# Patient Record
Sex: FEMALE | Race: WHITE
[De-identification: names, ages, dates, MRNs, and addresses within clinical notes are randomized per-mention and may not be internally consistent; named-entity substitution may affect disease eponyms.]

---

## 2021-11-09 LAB
BUN BLD-MCNC: 17 MG/DL (ref 7–18)
GLUCOSE SERPLBLD-MCNC: 94 MG/DL (ref 74–106)
HCT VFR BLD CALC: 38.5 % (ref 36–45)
LYMPHOCYTES # SPEC AUTO: 1.9 K/UL (ref 0.7–4.9)
PMV BLD: 5.9 FL (ref 7.6–11.3)
POTASSIUM SERPL-SCNC: 4.4 MMOL/L (ref 3.5–5.1)
RBC # BLD: 4.35 M/UL (ref 3.86–4.86)

## 2021-11-09 NOTE — RAD REPORT
EXAM DESCRIPTION:  RAD - Chest Pa And Lat (2 Views) - 11/9/2021 12:44 pm

 

CLINICAL HISTORY:  PRE-OP, right breast mass

 

COMPARISON:  None

 

TECHNIQUE:  Frontal and lateral views of the chest were obtained.

 

FINDINGS:  The lungs are clear.   Heart size is normal and central vasculature is within normal limit
s.  No pleural effusion or pneumothorax seen.  No acute bony finding noted.  No aortic abnormality.

 

IMPRESSION:  No acute cardiopulmonary process.

## 2021-11-10 ENCOUNTER — HOSPITAL ENCOUNTER (OUTPATIENT)
Dept: HOSPITAL 97 - OR | Age: 61
Discharge: HOME | End: 2021-11-10
Attending: SURGERY
Payer: COMMERCIAL

## 2021-11-10 VITALS — SYSTOLIC BLOOD PRESSURE: 110 MMHG | DIASTOLIC BLOOD PRESSURE: 65 MMHG

## 2021-11-10 VITALS — TEMPERATURE: 97.1 F

## 2021-11-10 VITALS — OXYGEN SATURATION: 94 %

## 2021-11-10 DIAGNOSIS — D05.11: Primary | ICD-10-CM

## 2021-11-10 PROCEDURE — 0HBT0ZZ EXCISION OF RIGHT BREAST, OPEN APPROACH: ICD-10-PCS

## 2021-11-10 PROCEDURE — 93005 ELECTROCARDIOGRAM TRACING: CPT

## 2021-11-10 PROCEDURE — 07B50ZX EXCISION OF RIGHT AXILLARY LYMPHATIC, OPEN APPROACH, DIAGNOSTIC: ICD-10-PCS

## 2021-11-10 PROCEDURE — 85025 COMPLETE CBC W/AUTO DIFF WBC: CPT

## 2021-11-10 PROCEDURE — 38525 BIOPSY/REMOVAL LYMPH NODES: CPT

## 2021-11-10 PROCEDURE — 88333 PATH CONSLTJ SURG CYTO XM 1: CPT

## 2021-11-10 PROCEDURE — 88334 PATH CONSLTJ SURG CYTO XM EA: CPT

## 2021-11-10 PROCEDURE — 80048 BASIC METABOLIC PNL TOTAL CA: CPT

## 2021-11-10 PROCEDURE — 19301 PARTIAL MASTECTOMY: CPT

## 2021-11-10 PROCEDURE — 88305 TISSUE EXAM BY PATHOLOGIST: CPT

## 2021-11-10 PROCEDURE — 38900 IO MAP OF SENT LYMPH NODE: CPT

## 2021-11-10 PROCEDURE — 78195 LYMPH SYSTEM IMAGING: CPT

## 2021-11-10 PROCEDURE — 36415 COLL VENOUS BLD VENIPUNCTURE: CPT

## 2021-11-10 PROCEDURE — 19285 PERQ DEV BREAST 1ST US IMAG: CPT

## 2021-11-10 PROCEDURE — 88307 TISSUE EXAM BY PATHOLOGIST: CPT

## 2021-11-10 PROCEDURE — 71046 X-RAY EXAM CHEST 2 VIEWS: CPT

## 2021-11-10 RX ADMIN — HYDROMORPHONE HYDROCHLORIDE ONE MG: 1 INJECTION, SOLUTION INTRAMUSCULAR; INTRAVENOUS; SUBCUTANEOUS at 11:56

## 2021-11-10 RX ADMIN — HYDROMORPHONE HYDROCHLORIDE ONE MG: 1 INJECTION, SOLUTION INTRAMUSCULAR; INTRAVENOUS; SUBCUTANEOUS at 12:04

## 2021-11-10 NOTE — RAD REPORT
EXAM DESCRIPTION:  US - Brst,Preop NL Wire Init w/Guid - 11/10/2021 8:34 am

 

CLINICAL HISTORY:  Breast cancer

 

FINDINGS:  The skin, subcutaneous tissues and breast tissue were anesthetized with lidocaine.

 

Under sonographic guidance a Kopan's hook wire was placed into the right breast mass mass within the 
upper inner right breast.

 

The patient then left for the surgical department

 

IMPRESSION:  Ultrasound-guided needle wire localization of a right breast mass

## 2021-11-10 NOTE — RAD REPORT
EXAM DESCRIPTION:  NM - Lymphoscintigraphy - 11/10/2021 8:07 am

 

CLINICAL HISTORY:   Breast cancer

 

COMPARISON:  None.

 

TECHNIQUE:  Four injections of 0.01 millicuries technetium filtered sulfur colloid administered into 
the the sanam arerolar region of the right breast. The injections were placed at Twelve o'clock, 3 o'c
lock, 6 o'clock and 9 o'clock positions.

 

Subsequently a scintigram was obtained which demonstrated the radiotracer within these locations.

 

IMPRESSION:  Right breast lymphoscintigram.

## 2021-11-10 NOTE — OP
Date of Procedure:  11/10/2021



Surgeon:  Teddy Marie MD



Assistant:  HARSHAL Causey



Preoperative Diagnosis:  Right breast cancer.



Postoperative Diagnosis:  Right breast cancer.



Procedures:  Needle localization, right breast lumpectomy and sentinel node biopsy.



Estimated Blood Loss:  Minimal.



Specimen:  [place] node which was negative for metastatic disease and right breast lumpectomy with m
argins free.



Findings:  As above.



Anesthesia:  General.



Complications:  None.



Disposition:  The patient tolerated the procedure in stable condition and taken to Recovery in good g
eneral condition.



Procedure In Detail:  The patient was brought to the OR and placed in supine position.  General anest
hesia begun.  Prior to being the patient prepped and draped, methylene blue under sterile condition w
as injected around the nipple-areolar complex and then breast was massaged and then the patient was p
repped and draped in the usual sterile fashion.  Then, the sentinel node counting device was used to 
identify the sentinel node in the right axilla.  All the counts were recorded in the medical record. 
 A 3 cm incision was made in the right axilla and a blue lymph node was identified in the deep tissue
.  Vascular clips were used to clamp the neurovascular attachments to the lymph node and then lymph n
ode was excised.  Frozen section revealed no evidence of metastatic disease.  Wound irrigated.  Bleed
ing controlled with cautery and then 3-0 chromic used to reapproximate subcutaneous tissue and close 
the skin and then around the needle that was placed during needle localization, ellipse of skin media
l to lateral was made approximately 8 x 3 cm.  Subcutaneous tissue divided.  Flaps created in all dir
ections and dissection proceeded all the way down to the pectoralis fascia and the entire tissue at t
he tip of the needle was excised and sent to pathology for margin check, margins were negative.  The 
closest margin was deep one, which was approximately 5-6 mm.  Wound irrigated.  Bleeding controlled w
ith cautery.  A 3-0 chromic used to approximate the subcutaneous tissue and close the skin.  Sterile 
dressing applied.  The patient was awakened and taken to Recovery in good general condition.



Discharge Note:  The patient will go to Day Surgery and home when stable.



Disposition:  Home.



Condition:  Stable.



Discharge Instructions:  Resume home medications and diet.  Activity as tolerated.  No heavy lifting.
  Keep dressing clean and dry.  Sponge bath only.  Tylenol No.3 one tablet p.o. q.4 p.r.n. pain, Kefl
ex 500 mg p.o. q.6.  Follow up in my office in 1 week.  Call for appointment.





AS/APRIL

DD:  11/10/2021 11:12:44Voice ID:  [id number]

DT:  11/10/2021 16:01:43Report ID:  [id number]

## 2021-11-10 NOTE — EKG
Test Date:    2021-11-09               Test Time:    12:05:32

Technician:   FRIDA                                   

                                                     

MEASUREMENT RESULTS:                                       

Intervals:                                           

Rate:         63                                     

OH:           132                                    

QRSD:         82                                     

QT:           460                                    

QTc:          470                                    

Axis:                                                

P:            32                                     

OH:           132                                    

QRS:          69                                     

T:            30                                     

                                                     

INTERPRETIVE STATEMENTS:                                       

                                                     

Normal sinus rhythm

Nonspecific ST and T wave abnormality

Prolonged QT

Abnormal ECG

No previous ECG available for comparison



Electronically Signed On 11-10-21 11:20:21 CST by Vipin Ronquillo

## 2021-12-06 LAB
HCT VFR BLD CALC: 37.6 % (ref 36–45)
LYMPHOCYTES # SPEC AUTO: 1.6 K/UL (ref 0.7–4.9)
PMV BLD: 6.1 FL (ref 7.6–11.3)
RBC # BLD: 4.23 M/UL (ref 3.86–4.86)

## 2021-12-08 ENCOUNTER — HOSPITAL ENCOUNTER (OUTPATIENT)
Dept: HOSPITAL 97 - OR | Age: 61
Discharge: HOME | End: 2021-12-08
Attending: SURGERY
Payer: COMMERCIAL

## 2021-12-08 VITALS — TEMPERATURE: 97.6 F | OXYGEN SATURATION: 96 % | DIASTOLIC BLOOD PRESSURE: 70 MMHG | SYSTOLIC BLOOD PRESSURE: 113 MMHG

## 2021-12-08 DIAGNOSIS — C50.911: Primary | ICD-10-CM

## 2021-12-08 PROCEDURE — 76000 FLUOROSCOPY <1 HR PHYS/QHP: CPT

## 2021-12-08 PROCEDURE — 36561 INSERT TUNNELED CV CATH: CPT

## 2021-12-08 PROCEDURE — 85025 COMPLETE CBC W/AUTO DIFF WBC: CPT

## 2021-12-08 PROCEDURE — 71045 X-RAY EXAM CHEST 1 VIEW: CPT

## 2021-12-08 PROCEDURE — 0JH60WZ INSERTION OF TOTALLY IMPLANTABLE VASCULAR ACCESS DEVICE INTO CHEST SUBCUTANEOUS TISSUE AND FASCIA, OPEN APPROACH: ICD-10-PCS

## 2021-12-08 PROCEDURE — 36415 COLL VENOUS BLD VENIPUNCTURE: CPT

## 2021-12-08 RX ADMIN — HEPARIN SODIUM ONE UNIT: 5000 INJECTION, SOLUTION INTRAVENOUS; SUBCUTANEOUS at 10:07

## 2021-12-08 RX ADMIN — HEPARIN SODIUM ONE UNIT: 5000 INJECTION, SOLUTION INTRAVENOUS; SUBCUTANEOUS at 09:59

## 2021-12-08 RX ADMIN — HEPARIN SODIUM ONE UNIT: 1000 INJECTION, SOLUTION INTRAVENOUS; SUBCUTANEOUS at 09:58

## 2021-12-08 RX ADMIN — HEPARIN SODIUM ONE UNIT: 1000 INJECTION, SOLUTION INTRAVENOUS; SUBCUTANEOUS at 10:07

## 2021-12-08 RX ADMIN — CEFAZOLIN ONE MLS: 1 INJECTION, POWDER, FOR SOLUTION INTRAMUSCULAR; INTRAVENOUS; PARENTERAL at 09:45

## 2021-12-08 RX ADMIN — HEPARIN SODIUM ONE UNIT: 5000 INJECTION, SOLUTION INTRAVENOUS; SUBCUTANEOUS at 10:08

## 2021-12-08 RX ADMIN — CEFAZOLIN ONE MLS: 1 INJECTION, POWDER, FOR SOLUTION INTRAMUSCULAR; INTRAVENOUS; PARENTERAL at 09:21

## 2021-12-08 NOTE — RAD REPORT
EXAM DESCRIPTION:  RADRosendot Single View12/8/2021 10:51 am

 

CLINICAL HISTORY:  Device placement/central venous catheter placement

 

 

IMPRESSION:  Central venous catheter with its tip in the superior vena cava

 

No pneumothorax

## 2021-12-08 NOTE — OP
Date of Procedure:  12/08/2021



Surgeon:  Teddy Marie MD



Assistant:  HARSHAL Cordon.



Preoperative Diagnosis:  Right breast cancer.



Postoperative Diagnosis:  Right breast cancer.



Procedure:  Left internal jugular Port-A-Cath placement and interpretation, intraoperative fluoroscop
y.



Estimated Blood Loss:  Minimal.



Specimens:  None.



Findings:  Normal anatomy.



Anesthesia:  General.



Complications:  None.



Condition:  Patient tolerated the procedure in stable condition.  Taken to Recovery in good general c
ondition.



Operative Report:  The patient was brought to the OR and placed in supine position.  General anesthes
ia began.  The patient was prepped and draped in usual sterile fashion.  Marcaine 0.5% was infiltrate
d locally for postop pain control.  18-gauge needle was used to access the left IJ vein, guidewire pa
ssed, position confirmed with fluoroscopy.  A 3 cm counter incision was made on the left anterior patti
st, subcutaneous tissue divided, pocket created.  Tunneling device used to tunnel the catheter betwee
n the two wounds.  Seldinger technique used.  Tip of the catheter placed in the SVC under fluoroscopy
.  After appropriate size, attached to the Port-A-Cath device.  Port-A-Cath device attached to the sanchez
bcutaneous tissue with 3-0 Vicryl and 2-0 chromic used to approximate the subcutaneous tissue and emmanuel
se the skin.  The port flushed with heparin and packed with heparin with good blood flow.  Sterile dr
essing applied.  Patient awakened and taken to Recovery in good general condition.  Chest x-ray has b
een ordered.  The patient will be discharged to home.



Disposition:  Home.



Condition:  Stable.



Discharge Instructions:  Resume home medications and diet.  Activity as tolerated.  No heavy lifting.
  Remove outer dressing in 2 days.  Shower.  Keep wound clean and dry.  Follow up in my office in 2 w
eeks.  Call for appointment.  Follow up in Cancer Center.  Keep Steri-Strips on at all times.  Tyleno
l No 3 one tablet p.o. q.4 p.r.n. pain.





AS/MODL

DD:  12/08/2021 10:25:48Voice ID:  [id number]

DT:  12/08/2021 10:41:41Report ID:  [id number]

## 2021-12-08 NOTE — RAD REPORT
EXAM DESCRIPTION:

RAD - Fluoroscopy <1 Hour - 12/8/2021 10:35 am

 

CLINICAL HISTORY:  Device placement central venous catheter placement

 

FINDINGS:  A central venous catheter was placed into the superior vena cava. Three fluoroscopic spot 
images are submitted. The examination was performed by Dr. Marie. Fluoroscopy time minutes

## 2022-01-26 ENCOUNTER — HOSPITAL ENCOUNTER (OUTPATIENT)
Dept: HOSPITAL 97 - ER | Age: 62
Setting detail: OBSERVATION
LOS: 1 days | Discharge: HOME | End: 2022-01-27
Attending: HOSPITALIST | Admitting: HOSPITALIST
Payer: COMMERCIAL

## 2022-01-26 VITALS — BODY MASS INDEX: 31.6 KG/M2

## 2022-01-26 DIAGNOSIS — A41.9: Primary | ICD-10-CM

## 2022-01-26 DIAGNOSIS — J12.9: ICD-10-CM

## 2022-01-26 DIAGNOSIS — E78.5: ICD-10-CM

## 2022-01-26 DIAGNOSIS — F41.9: ICD-10-CM

## 2022-01-26 DIAGNOSIS — Z20.822: ICD-10-CM

## 2022-01-26 DIAGNOSIS — Z80.9: ICD-10-CM

## 2022-01-26 DIAGNOSIS — C50.919: ICD-10-CM

## 2022-01-26 DIAGNOSIS — I10: ICD-10-CM

## 2022-01-26 LAB
ALBUMIN SERPL BCP-MCNC: 3.1 G/DL (ref 3.4–5)
ALP SERPL-CCNC: 162 U/L (ref 45–117)
ALT SERPL W P-5'-P-CCNC: 32 U/L (ref 12–78)
AMYLASE SERPL-CCNC: 39 U/L (ref 25–115)
ANISOCYTOSIS BLD QL: (no result)
AST SERPL W P-5'-P-CCNC: 15 U/L (ref 15–37)
BUN BLD-MCNC: 14 MG/DL (ref 7–18)
CKMB CREATINE KINASE MB: < 1 NG/ML (ref 1–3.6)
COHGB MFR BLDA: 1.2 % (ref 0–1.5)
GLUCOSE SERPLBLD-MCNC: 117 MG/DL (ref 74–106)
HCT VFR BLD CALC: 32.7 % (ref 36–45)
INR BLD: 0.96
LIPASE SERPL-CCNC: 39 U/L (ref 73–393)
LYMPHOCYTES # SPEC AUTO: 0.2 K/UL (ref 0.7–4.9)
MORPHOLOGY BLD-IMP: (no result)
OXYHGB MFR BLDA: 93.1 % (ref 94–97)
PMV BLD: 5.9 FL (ref 7.6–11.3)
POLYCHROMASIA BLD QL SMEAR: (no result)
POTASSIUM SERPL-SCNC: 3.9 MMOL/L (ref 3.5–5.1)
RBC # BLD: 3.54 M/UL (ref 3.86–4.86)
SAO2 % BLDA: 95.5 % (ref 92–98.5)
TOXIC GRANULES BLD QL SMEAR: (no result)
TROPONIN I SERPL HS-MCNC: 13.9 PG/ML (ref ?–58.9)

## 2022-01-26 PROCEDURE — 83605 ASSAY OF LACTIC ACID: CPT

## 2022-01-26 PROCEDURE — 71045 X-RAY EXAM CHEST 1 VIEW: CPT

## 2022-01-26 PROCEDURE — 96375 TX/PRO/DX INJ NEW DRUG ADDON: CPT

## 2022-01-26 PROCEDURE — 87040 BLOOD CULTURE FOR BACTERIA: CPT

## 2022-01-26 PROCEDURE — 85025 COMPLETE CBC W/AUTO DIFF WBC: CPT

## 2022-01-26 PROCEDURE — 84145 PROCALCITONIN (PCT): CPT

## 2022-01-26 PROCEDURE — 81015 MICROSCOPIC EXAM OF URINE: CPT

## 2022-01-26 PROCEDURE — 80076 HEPATIC FUNCTION PANEL: CPT

## 2022-01-26 PROCEDURE — 85610 PROTHROMBIN TIME: CPT

## 2022-01-26 PROCEDURE — 81003 URINALYSIS AUTO W/O SCOPE: CPT

## 2022-01-26 PROCEDURE — 83690 ASSAY OF LIPASE: CPT

## 2022-01-26 PROCEDURE — 82553 CREATINE MB FRACTION: CPT

## 2022-01-26 PROCEDURE — 82550 ASSAY OF CK (CPK): CPT

## 2022-01-26 PROCEDURE — 96374 THER/PROPH/DIAG INJ IV PUSH: CPT

## 2022-01-26 PROCEDURE — 85730 THROMBOPLASTIN TIME PARTIAL: CPT

## 2022-01-26 PROCEDURE — 82805 BLOOD GASES W/O2 SATURATION: CPT

## 2022-01-26 PROCEDURE — 93005 ELECTROCARDIOGRAM TRACING: CPT

## 2022-01-26 PROCEDURE — 36415 COLL VENOUS BLD VENIPUNCTURE: CPT

## 2022-01-26 PROCEDURE — 80048 BASIC METABOLIC PNL TOTAL CA: CPT

## 2022-01-26 PROCEDURE — 82150 ASSAY OF AMYLASE: CPT

## 2022-01-26 PROCEDURE — 84484 ASSAY OF TROPONIN QUANT: CPT

## 2022-01-26 PROCEDURE — 94760 N-INVAS EAR/PLS OXIMETRY 1: CPT

## 2022-01-26 PROCEDURE — 80053 COMPREHEN METABOLIC PANEL: CPT

## 2022-01-26 PROCEDURE — 99284 EMERGENCY DEPT VISIT MOD MDM: CPT

## 2022-01-26 RX ADMIN — ENOXAPARIN SODIUM SCH MG: 40 INJECTION SUBCUTANEOUS at 20:00

## 2022-01-26 RX ADMIN — Medication SCH ML: at 21:00

## 2022-01-26 RX ADMIN — SODIUM CHLORIDE SCH MLS: 9 INJECTION, SOLUTION INTRAVENOUS at 20:00

## 2022-01-26 NOTE — ER
Nurse's Notes                                                                                     

 [facility]                                                                 

Name: Megan Coronado                                                                                   

Age: 61 yrs                                                                                       

Sex: Female                                                                                       

: 1960                                                                                   

MRN: [id number]                                                                                   

Arrival Date: 2022                                                                          

Time: 10:42                                                                                       

Account#: [id number]                                                                            

Bed 27                                                                                            

Private MD: Earl Monteiro                                                                         

Diagnosis: Other pneumonia, unspecified organism;Elevated white blood cell count, unspecified     

                                                                                                  

Presentation:                                                                                     

                                                                                             

10:56 Chief complaint: Patient states: "I started running a fever this morning. fever high of jd3 

      100.3. yall will need to call Dr. Lowery over at the cancer center here in town.".      

      Coronavirus screen: cough unrelated to allergies, headache, Client presents with at         

      least one sign or symptom that may indicate coronavirus-19. Standard/surgical mask          

      placed on the client. Provider contacted for isolation considerations. Ebola Screen: No     

      symptoms or risks identified at this time. Initial Sepsis Screen: Does the patient meet     

      any 2 criteria? No. Patient's initial sepsis screen is negative. Does the patient have      

      a suspected source of infection? No. Patient's initial sepsis screen is negative. Risk      

      Assessment: Do you want to hurt yourself or someone else? Patient reports no desire to      

      harm self or others. Onset of symptoms was 2022.                                

10:56 Method Of Arrival: Ambulatory                                                           jd3 

10:56 Acuity: CECIL 3                                                                           jd3 

                                                                                                  

Historical:                                                                                       

- Allergies:                                                                                      

10:58 No Known Allergies;                                                                     jd3 

- Home Meds:                                                                                      

10:58 steroid [Active]; chemo [Active];                                                       jd3 

- PMHx:                                                                                           

10:58 breast cancer; high cholesteral;                                                        jd3 

- PSHx:                                                                                           

10:58 None;                                                                                   jd3 

                                                                                                  

- Immunization history:: Adult Immunizations up to date, Client reports having NOT                

  received the Covid vaccine. Flu vaccine is not up to date.                                      

- Social history:: Smoking status: Patient denies any tobacco usage or history of.                

- Family history:: not pertinent.                                                                 

                                                                                                  

                                                                                                  

Screenin:01 Abuse screen: Denies threats or abuse. Denies injuries from another. Nutritional        ab2 

      screening: No deficits noted. Tuberculosis screening: No symptoms or risk factors           

      identified. Fall Risk None identified.                                                      

                                                                                                  

Assessment:                                                                                       

11:59 General: Appears in no apparent distress. comfortable, Behavior is calm, cooperative,   ab2 

      appropriate for age. Pain: Denies pain. Neuro: Level of Consciousness is awake, alert,      

      obeys commands, Oriented to person, place, time, situation, Appropriate for age        

      are equal bilaterally Moves all extremities. Gait is steady. Cardiovascular: No             

      deficits noted. Denies chest pain, shortness of breath, Heart tones S1 S2 present           

      Patient's skin is warm and dry. Chest pain is denied. Respiratory: Airway is patent.        

      GI: No deficits noted. No signs and/or symptoms were reported involving the                 

      gastrointestinal system. : No deficits noted. No signs and/or symptoms were reported      

      regarding the genitourinary system. EENT: No deficits noted. No signs and/or symptoms       

      were reported regarding the EENT system. Derm: Parent/caregiver reports the patient         

      having Pt reports fever of 100.5. Musculoskeletal: No deficits noted. No signs and/or       

      symptoms reported regarding the musculoskeletal system.                                     

                                                                                                  

Vital Signs:                                                                                      

10:59  / 66; Pulse 100; Resp 18 S; Temp 99.4(TE); Pulse Ox 97% on R/A; Weight 86.64 kg  jd3 

      (R); Height 5 ft. 5 in. (165.10 cm) (R); Pain 0/10;                                         

12:01  / 59; Pulse 78; Resp 16; Pulse Ox 96% on R/A; Pain 0/10;                         ab2 

13:12  / 62; Pulse 75; Resp 16; Temp 98.8(O); Pulse Ox 96% ; Pain 0/10;                 ab2 

14:13  / 55; Pulse 81; Resp 16; Pulse Ox 100% on R/A;                                   ab2 

15:35 BP 97 / 55; Pulse 84; Resp 16; Pulse Ox 95% on R/A;                                     ab2 

16:34  / 71; Pulse 90; Resp 16; Pulse Ox 98% on R/A;                                    ab2 

10:59 Body Mass Index 31.78 (86.64 kg, 165.10 cm)                                             jd3 

                                                                                                  

ED Course:                                                                                        

10:42 Patient arrived in ED.                                                                  am2 

10:43 Earl Monteiro DO is Private Physician.                                                 am2 

10:58 Triage completed.                                                                       jd3 

11:00 Arm band placed on.                                                                     jd3 

11:03 Wilfrido Tidwell is Primary Nurse.                                                    ab2 

11:28 Hugo Browning MD is Attending Physician.                                           ma2 

11:33 Ky Leyva PA is PHCP.                                                                cp  

11:53 Amylase, Serum Sent.                                                                    ab2 

11:53 Basic Metabolic Panel Sent.                                                             ab2 

11:53 Blood Culture Adult (2) Sent.                                                           ab2 

11:53 CBC with Diff Sent.                                                                     ab2 

11:53 CPK Sent.                                                                               ab2 

11:53 Ckmb Sent.                                                                              ab2 

11:53 LFT's Sent.                                                                             ab2 

11:53 Lactate Sent.                                                                           ab2 

11:53 Lipase Sent.                                                                            ab2 

11:53 Procalcitonin Sent.                                                                     ab2 

11:53 Protime (+inr) Sent.                                                                    ab2 

11:53 Ptt, Activated Sent.                                                                    ab2 

11:53 Troponin HS Sent.                                                                       ab2 

11:56 SARS-COV-2 RT PCR (Document "Date of Onset" if Symptomatic) Sent.                       ab2 

12:01 Patient has correct armband on for positive identification. Bed in low position. Call   ab2 

      light in reach. Side rails up X2. Adult w/ patient.                                         

12:01 No provider procedures requiring assistance completed. Inserted saline lock: 20 gauge   ab2 

      in left antecubital area, using aseptic technique.                                          

12:44 Chest Single View XRAY In Process Unspecified.                                          EDMS

13:12 Urine Microscopic Only Sent.                                                            ab2 

14:41 Efra Mejia is Hospitalizing Provider.                                               ma2 

                                                                                                  

Administered Medications:                                                                         

11:59 Drug: NS 0.9% 1000 ml Route: IV; Rate: 1 bolus; Site: left antecubital;                 ab2 

13:51 Drug: Rocephin (cefTRIAXone) 1 grams Route: IV; Rate: calculated rate; Site: left       ab2 

      antecubital;                                                                                

13:51 Drug: AZITHromycin 500 mg Route: IVPB; Infused Over: 1 hrs; Site: left antecubital;     ab2 

                                                                                                  

                                                                                                  

Outcome:                                                                                          

14:41 Decision to Hospitalize by Provider.                                                    ma2 

                                                                                             

16:09 Patient left the ED.                                                                    5 

                                                                                                  

Signatures:                                                                                       

Dispatcher [employer]                           EDMS                                                 

Ky Leyva PA PA cp Martinez, Maria                              5                                                  

Etta Espinoza am2                                                  

Param Stuart RN                    RN   Hugo Coleman MD MD   ma2                                                  

Wilfrido Tidwell                           ab2                                                  

                                                                                                  

Corrections: (The following items were deleted from the chart)                                    

                                                                                             

10:59 10:56 Chief complaint: Patient states: "I started running a fever this morning. fever   jd3 

      high of 100.3. yall will need to call Dr. Carr over at the cancer center here in town."     

      jd3                                                                                         

                                                                                                  

**************************************************************************************************

## 2022-01-26 NOTE — EDPHYS
Physician Documentation                                                                           

 [facility]                                                                 

Name: Megan Coronado                                                                                   

Age: 61 yrs                                                                                       

Sex: Female                                                                                       

: 1960                                                                                   

MRN: [id number]                                                                                   

Arrival Date: 2022                                                                          

Time: 10:42                                                                                       

Account#: [id number]                                                                            

Bed 27                                                                                            

Private MD: [facility]                                                                         

ED Physician Hugo Browning                                                                    

HPI:                                                                                              

                                                                                             

11:57 This 61 yrs old Unknown Female presents to ER via Ambulatory with complaints of Fever.  ma2 

11:57 61, with history of breast cancer received chemotherapy weekly last dose was 4 days     ma2 

      ago, presents with fever 100.5 since this morning, has mild cough no other symptoms,        

      symptoms are constant and mild..                                                            

                                                                                                  

Historical:                                                                                       

- Allergies:                                                                                      

10:58 No Known Allergies;                                                                     jd3 

- Home Meds:                                                                                      

10:58 steroid [Active]; chemo [Active];                                                       jd3 

- PMHx:                                                                                           

10:58 breast cancer; high cholesteral;                                                        jd3 

- PSHx:                                                                                           

10:58 None;                                                                                   jd3 

                                                                                                  

- Immunization history:: Adult Immunizations up to date, Client reports having NOT                

  received the Covid vaccine. Flu vaccine is not up to date.                                      

- Social history:: Smoking status: Patient denies any tobacco usage or history of.                

- Family history:: not pertinent.                                                                 

                                                                                                  

                                                                                                  

ROS:                                                                                              

11:57 Constitutional: Negative for fever, chills, and weight loss.                            ma2 

11:57 All other systems are negative.                                                             

                                                                                                  

Exam:                                                                                             

11:57 Constitutional:  This is a well developed, well nourished patient who is awake, alert,  ma2 

      and in no acute distress. Head/Face:  Normocephalic, atraumatic. Eyes:  Pupils equal        

      round and reactive to light, extra-ocular motions intact.  Lids and lashes normal.          

      Conjunctiva and sclera are non-icteric and not injected.  Cornea within normal limits.      

      Periorbital areas with no swelling, redness, or edema. ENT:  Nares patent. No nasal         

      discharge, no septal abnormalities noted.  Tympanic membranes are normal and external       

      auditory canals are clear.  Oropharynx with no redness, swelling, or masses, exudates,      

      or evidence of obstruction, uvula midline.  Mucous membranes moist. Neck:  Trachea          

      midline, no thyromegaly or masses palpated, and no cervical lymphadenopathy.  Supple,       

      full range of motion without nuchal rigidity, or vertebral point tenderness.  No            

      Meningismus. Chest/axilla:  Normal chest wall appearance and motion.  Nontender with no     

      deformity.  No lesions are appreciated. Cardiovascular:  Regular rate and rhythm with a     

      normal S1 and S2.  No gallops, murmurs, or rubs.  Normal PMI, no JVD.  No pulse             

      deficits. Respiratory:  Lungs have equal breath sounds bilaterally, clear to                

      auscultation and percussion.  No rales, rhonchi or wheezes noted.  No increased work of     

      breathing, no retractions or nasal flaring. Abdomen/GI:  Soft, non-tender, with normal      

      bowel sounds.  No distension or tympany.  No guarding or rebound.  No evidence of           

      tenderness throughout. Skin:  Warm, dry with normal turgor.  Normal color with no           

      rashes, no lesions, and no evidence of cellulitis. MS/ Extremity:  Pulses equal, no         

      cyanosis.  Neurovascular intact.  Full, normal range of motion. Neuro:  Awake and           

      alert, GCS 15, oriented to person, place, time, and situation.  Cranial nerves II-XII       

      grossly intact.  Motor strength 5/5 in all extremities.  Sensory grossly intact.            

      Cerebellar exam normal.  Normal gait.                                                       

                                                                                                  

Vital Signs:                                                                                      

10:59  / 66; Pulse 100; Resp 18 S; Temp 99.4(TE); Pulse Ox 97% on R/A; Weight 86.64 kg  jd3 

      (R); Height 5 ft. 5 in. (165.10 cm) (R); Pain 0/10;                                         

12:01  / 59; Pulse 78; Resp 16; Pulse Ox 96% on R/A; Pain 0/10;                         ab2 

13:12  / 62; Pulse 75; Resp 16; Temp 98.8(O); Pulse Ox 96% ; Pain 0/10;                 ab2 

14:13  / 55; Pulse 81; Resp 16; Pulse Ox 100% on R/A;                                   ab2 

15:35 BP 97 / 55; Pulse 84; Resp 16; Pulse Ox 95% on R/A;                                     ab2 

16:34  / 71; Pulse 90; Resp 16; Pulse Ox 98% on R/A;                                    ab2 

10:59 Body Mass Index 31.78 (86.64 kg, 165.10 cm)                                             jd3 

                                                                                                  

MDM:                                                                                              

11:30 Patient medically screened.                                                             [facility] 

14:34 Differential diagnosis: viral Infection, URI, bronchitis, pneumonia. Data reviewed:     ma2 

      vital signs, nurses notes. Counseling: I had a detailed discussion with the patient         

      and/or guardian regarding: the historical points, exam findings, and any diagnostic         

      results supporting the discharge/admit diagnosis, the presence of at least one elevated     

      blood pressure reading (>120/80) during this emergency department visit, the need for       

      outpatient follow up. Response to treatment: the patient's symptoms have markedly           

      improved after treatment.                                                                   

14:40 ED course: Patient has UTI, white count is 20,000 I discussed with Asmita fleming 

      oncologist and she recommends IV antibiotics, admission overnight for observation.          

      Covid is negative.                                                                          

                                                                                                  

                                                                                             

11:29 Order name: Amylase, Serum; Complete Time: 13:                                                                                             

11:29 Order name: Basic Metabolic Panel; Complete Time: 13:                                                                                             

11:29 Order name: Blood Culture Adult (2)                                                                                                                                                  

11:29 Order name: CBC with Diff; Complete Time: 13:37                                                                                                                                      

11:29 Order name: CPK; Complete Time: 13:                                                                                             

11:29 Order name: Ckmb; Complete Time: 13:                                                                                             

11:29 Order name: LFT's; Complete Time: 13:33                                                                                                                                              

11:29 Order name: Lactate; Complete Time: 13:33                                                                                                                                            

11:29 Order name: Lipase; Complete Time: 13:                                                                                             

11:29 Order name: Procalcitonin; Complete Time: 13:33                                                                                                                                      

11:29 Order name: Protime (+inr); Complete Time: 13:                                                                                             

11:29 Order name: Ptt, Activated; Complete Time: 13:                                                                                             

11:29 Order name: Troponin HS; Complete Time: 13:33                                                                                                                                        

11:29 Order name: Urine Microscopic Only; Complete Time: 13:33                                                                                                                             

11:29 Order name: Chest Single View XRAY; Complete Time: 13:33                                                                                                                             

11:29 Order name: Accucheck; Complete Time: 11:59                                                                                                                                          

11:29 Order name: Cardiac monitoring; Complete Time: 11:53                                                                                                                                 

11:29 Order name: EKG - Nurse/Tech; Complete Time: 11:53                                      ma2 

                                                                                             

11:29 Order name: IV Saline Lock - Large Bore; Complete Time: 11:53                           ma2 

                                                                                             

11:29 Order name: Labs collected and sent; Complete Time: 11:53                               ma2 

                                                                                             

11:29 Order name: O2 Per Protocol; Complete Time: 11:53                                       ma2 

                                                                                             

11:29 Order name: SARS-COV-2 RT PCR (Document "Date of Onset" if Symptomatic); Complete Time: ma2 

      14:33                                                                                       

                                                                                             

13:12 Order name: Urine Dipstick-Ancillary; Complete Time: 13:33                              EDMS

                                                                                             

13:33 Order name: Manual Differential; Complete Time: 13:37                                   EDMS

                                                                                             

19:00 Order name: ABG Arterial Blood Gas                                                      EDMS

                                                                                             

02:50 Order name: CBC with Automated Diff                                                     EDMS

                                                                                             

03:03 Order name: Comprehensive Metabolic Panel                                               [facility]

                                                                                             

11:29 Order name: O2 Sat Monitoring; Complete Time: 11:53                                     [facility] 

                                                                                             

11:29 Order name: Urine Dipstick-Ancillary (obtain specimen); Complete Time: 13:12            ma2 

                                                                                                  

Administered Medications:                                                                         

11:59 Drug: NS 0.9% 1000 ml Route: IV; Rate: 1 bolus; Site: left antecubital;                 ab2 

13:51 Drug: Rocephin (cefTRIAXone) 1 grams Route: IV; Rate: calculated rate; Site: left       ab2 

      antecubital;                                                                                

13:51 Drug: AZITHromycin 500 mg Route: IVPB; Infused Over: 1 hrs; Site: left antecubital;     ab2 

                                                                                                  

                                                                                                  

Disposition Summary:                                                                              

22 14:41                                                                                    

Hospitalization Ordered                                                                           

      Hospitalization Status: Observation                                                     ma2 

      Provider: Efra Mejia 

      Condition: Stable                                                                       ma2 

      Problem: new                                                                            ma2 

      Symptoms: are unchanged                                                                 ma2 

      Bed/Room Type: Standard                                                                 ma2 

      [facility](22 15:51)                                                  dw  

      Room Assignment: (22 15:51)                                                       dw  

      Diagnosis                                                                                   

        - Other pneumonia, unspecified organism                                               ma2 

        - Elevated white blood cell count, unspecified                                        ma2 

      Forms:                                                                                      

        - Medication Reconciliation Form                                                      ma2 

        - SBAR form                                                                           ma2 

Signatures:                                                                                       

Dispatcher [employer]                           EDMS                                                 

Mallorie Cardenas Diana, RN RN dw Davies, Jonathon, RN RN jd3 Alzahri, Mohammad, MD                   MD   ma2                                                  

Wilfrido Tidwell                           ab2                                                  

                                                                                                  

Corrections: (The following items were deleted from the chart)                                    

17:10 14:41 Telemetry/MedSurg (observation) [facility]  

17:10 14:41 [facility]  

                                                                                             

14:16  17:10 [facility]  

                                                                                             

14:16  17:10 ERHOLD- bd                                                                    

                                                                                             

15:51 14:16 Telemetry/MedSurg (observation) [facility]  

15:51 14:16 [address] [facility]  

                                                                                                  

**************************************************************************************************

## 2022-01-26 NOTE — XMS REPORT
Continuity of Care Document

                           Created on:2022



Patient:TOMI MEHTA

Sex:Female

:1960

External Reference #:[id number]





Demographics







                          Address                   [address] [place] [address]

 

                          Home Phone                [phone]

 

                          Mobile Phone              [phone]

 

                          Email Address             [email or web address]

 

                          Preferred Language        en

 

                          Marital Status            Unknown

 

                          Yazidi Affiliation     Unknown

 

                          Race                      Unknown

 

                          Additional Race(s)        Unavailable



                                                    Unavailable

 

                          Ethnic Group              Unknown









Author







                          Organization              [facility]

t

 

                          Address                   [address]



                                                    [place] [address]

 

                          Phone                     [phone]









Support







                Name            Relationship    Address         Phone

 

                Jose          Unavailable     [address] [facility] [phone]



                                                [place] [address] 

 

                Jose          Unavailable     [address] [facility] [phone]



                                                [place] [address] 









Care Team Providers







                    Name                Role                Phone

 

                    Unavailable         Unavailable         Unavailable









Payers







           Payer Name Policy Type Policy Number Effective Date Expiration Date S

eriberto

 

           [employer] PPO I            [id number]  2010 00:00:00            







Problems

This patient has no known problems.



Allergies, Adverse Reactions, Alerts







       Allergy Allergy Status Severity Reaction(s) Onset  Inactive Treating Comm

ents 

Source



       Name   Type                        Date   Date   Clinician        

 

       NO KNOWN Drug   Active                                           [facility]



       ALLERGIE Class                                                   ity of



       [facility]







Medications







       Ordered Filled Start  Stop   Current Ordering Indication Dosage Frequency

 Signature

                    Comments            Components          Source



     Medication Medication Date Date Medication? Clinician                (SIG) 

          



     Name Name                                                   

 

     Venlafaxine Venlafaxine           Yes  Earl                1 tablet      

     [facility]



     HCl  HCl                 [place]                with food           [facility]

 

     Simvastatin Simvastatin           Yes  Earl                1 tablet      

     [facility]



                              [place]                               [facility]



                                                                 l



                                                                 [facility]



                                                                 ent



                                                                 Clinics







Immunizations







           Ordered    Filled Immunization Date       Status     Comments   Sourc

e



           Immunization Name Name                                        

 

           TDAP > 7   TDAP > 7   2020 Completed             [facility]



           Years-Adacel Years-Adacel 00:00:00                         [facility]



                                                                  Clinics

 

           Afluria single dose Afluria single dose 2019 Completed         

    [facility]



                                 00:00:00                         [facility]



                                                                  Clinics







Procedures

This patient has no known procedures.



Encounters







        Start   End     Encounter Admission Attending Care    Care    Encounter 

Source



        Date/Time Date/Time Type    Type    Clinicians Facility Department ID   

   

 

        2021-11-10 2021[facility]10 [id number]



                                                                        [facility]



                                                                        ent



                                                                        Clinics

 

        2021-10-28 2021-10-28 ambulatory                 [facility]  [facility]  [id number]

 [facility]



        [facility]                                                 [facility]



                                                                        [facility]



                                                                        [facility]



                                                                        ent



                                                                        Clinics

 

        2021-10-21 2021-10-21 Outpatient                 [facility]  [facility]  [id number]

 [facility]                                                 [facility]



                                                                        [facility]



                                                                        ent



                                                                        Clinics

 

        2021-10-21 2021-10-21 Outpatient                 [facility]  [facility]  [id number]

 [facility]                                                 [facility]



                                                                        [facility]



                                                                        ent



                                                                        Clinics

 

        2021-09-15 2021-09-15 Outpatient                 [facility]  [facility]  [id number]

 [facility]                                                 [facility]



                                                                        [facility]



                                                                        ent



                                                                        Clinics

 

        2021 Outpatient                 [facility]  [facility]  [id number]

 [facility]



        [facility]                                                 [facility]



                                                                        [facility]



                                                                        [facility]



                                                                        ent



                                                                        Clinics

 

        2021 Outpatient                 [facility]  [facility]  [id number]

 [facility]



        [facility]00                                                 [facility]



                                                                        [facility]



                                                                        [facility]



                                                                        ent



                                                                        Clinics

 

        2021 Outpatient                 [facility]  [facility]  [id number]

 [facility]



        [facility]00                                                 [facility]



                                                                        [facility]



                                                                        ent



                                                                        Clinics

 

        2021 Outpatient                 [facility]  [facility]  [id number]

 [facility]



        [facility]00                                                 [facility]



                                                                        [facility]



                                                                        [facility]



                                                                        ent



                                                                        Clinics

 

        2021 Outpatient                 [facility]  [facility]  [id number]

 [facility]



        [facility]                                                 [facility]



                                                                        [facility]



                                                                        [facility]



                                                                        ent



                                                                        Clinics

 

        2021 Outpatient                 [facility]  [facility]  [id number]

 [facility]



        [facility]00                                                 [facility]



                                                                        [facility]



                                                                        ent



                                                                        Clinics

 

        2021 Outpatient                 [facility]  [facility]  [id number]

 [facility]



        [facility]00                                                 [facility]



                                                                        [facility]



                                                                        ent



                                                                        Clinics

 

        2021 Outpatient                 [facility]  [facility]  [id number]

 [facility]



        00:[facility]00                                                 [facility]



                                                                        [facility]



                                                                        ent



                                                                        Clinics

 

        2021 Outpatient                 [facility]  [facility]  [id number]

 [facility]



        00:[facility]00                                                 [facility]



                                                                        [facility]i



                                                                        ent



                                                                        Clinics

 

        2021 Outpatient                 [facility]  [facility]  [id number]

 [facility]



        00:[facility]00                                                 [facility]



                                                                        [facility]



                                                                        ent



                                                                        Clinics

 

        2021 Outpatient                 [facility]  [facility]  [id number]

 [facility]



        00:[facility]00                                                 [facility]



                                                                        [facility]



                                                                        ent



                                                                        Clinics

 

        2021 Outpatient                 [facility]  [facility]  [id number]

 [facility]



        00:[facility]00                                                 [facility]



                                                                        [facility]



                                                                        ent



                                                                        Clinics

 

        2021-03-10 2021-03-10 Outpatient                 [facility]  [facility]  [id number]

 [facility]



        [facility]00                                                 [facility]



                                                                        [facility]



                                                                        ent



                                                                        Clinics

 

        2021 Outpatient                 [facility]  [facility]  [id number]

 [facility]



        00:[facility]00                                                 [facility]



                                                                        [facility]



                                                                        [facility]



                                                                        ent



                                                                        Clinics

 

        2021 Outpatient                 [facility]  [facility]  [id number]

 [facility]



        00[facility]00                                                 [facility]



                                                                        [facility]i



                                                                        ent



                                                                        Clinics

 

        2020 Emergency X               [facility]    [facility]     [id number]

[facility]



        14:25:00 14:25:00                                                 [facility]

 

        2020 Outpatient                 [facility]  [facility]  [id number]

 [facility]



        [facility]00                                                 [facility]



                                                                        [facility]



                                                                        ent



                                                                        Clinics

 

        2020-12-10 2020-12-10 Outpatient                 [facility]  [facility]  [id number]

 [facility]



        00:[facility]00                                                 [facility]



                                                                        [facility]



                                                                        [facility]



                                                                        ent



                                                                        Clinics

 

        2020 Outpatient                 [facility]  [facility]  [id number]

 [facility]



        00:[facility]00                                                 [facility]



                                                                        [facility]



                                                                        [facility]



                                                                        ent



                                                                        Clinics

 

        2020 Outpatient                 [facility]  [facility]  [id number]

 [facility]



        00:00[facility]00 00[facility]00[facility]00                                                 [facility]



                                                                        [facility]



                                                                        [facility]i



                                                                        ent



                                                                        Clinics

 

        2020 Outpatient                 [facility]  [facility]  [id number]

 [facility]



        00:00[facility]00 00[facility]00[facility]00                                                 [facility]



                                                                        [facility]



                                                                        [facility]i



                                                                        ent



                                                                        Clinics

 

        2020-10-14 2020-10-14 Outpatient                 [facility]  [id number]

 [facility]



        00:[facility]00                                                 [facility]



                                                                        [facility]



                                                                        [facility]pati



                                                                        ent



                                                                        Clinics

 

        2020 Outpatient                 [facility] [address]

[id number] [facility]



        09:30:00 09:30:00                         [facility]   [facility]  Medicine         l



                                                Medicine                 Outpati



                                                                        ent



                                                                        Clinics

 

        2020 Outpatient                 [facility] [address]

[id number] [facility]



        16:55:00 16:55:00                         t [employer]   [facility]



                                                Medicine                 Outpati



                                                                        ent



                                                                        Clinics

 

        2020 Outpatient                 [facility] [address]

[id number] [facility]



        09:30:00 09:30:00                         t [employer]   [facility]



                                                Medicine                 Outpati



                                                                        ent



                                                                        Clinics

 

        2019 Outpatient                 [facility] [address]

[id number] [facility]



        09:30:00 09:30:00                         t [facility]   [facility]



                                                Medicine                 Outpati



                                                                        ent



                                                                        Clinics







Results

This patient has no known results.

## 2022-01-26 NOTE — P.HP
Certification for Inpatient


Patient admitted to: Inpatient


With expected LOS: >2 Midnights


Practitioner: I am a practitioner with admitting privileges, knowledge of 

patient current condition, hospital course, and medical plan of care.


Services: Services provided to patient in accordance with Admission requirements

found in Title 42 Section 412.3 of the Code of Federal Regulations





Patient History


Date of Service: 01/26/22


Reason for admission: Fever


History of Present Illness: 


61-year-old old woman with a history of breast cancer, receiving chemotherapy 

presented to the emergency department with a complaint of fever, nonproductive 

cough and general malaise.  Symptoms have been present for about a couple of 

days.  Blood work done in the emergency department demonstrated leukocytosis 

with WBC of 20,000.  Temperature of 99.4 recorded in the ED. Sepsis screen: 

Lactate within normal limits, UA shows no evidence of UTI, chest x-ray 

demonstrated bilateral infiltrate suggestive of viral pneumonia.  She tested 

negative for COVID-19.  There is a concern for sepsis given elevated WBC in the 

context of immunosuppression from chemo.  Patient is hospitalized for further 

evaluation and management.





Allergies





No Known Allergies Allergy (Verified 12/08/21 08:53)


   





Home Medications: 








Simvastatin 20 mg PO BEDTIME 11/09/21 


Venlafaxine HCl [Effexor] 1 tab PO BEDTIME 11/09/21 








- Past Medical/Surgical History


-: Breast cancer


-: Hypertension


-: Anxiety


-: Hyperlipidemia


-: Port-A-Cath





- Family History


  ** Mother


-: Cancer





  ** Father


-: Cancer





- Social History


Smoking Status: Never smoker


Alcohol use: No


CD- Drugs: No


Place of Residence: Home





Review of Systems


Other: 


Patient denies any diarrhea, no nausea or vomiting.  She denied any abdominal 

pain.





Except as documented, all other systems reviewed and negative.








Physical Examination





- Physical Exam


General: Alert, In no apparent distress, Oriented x3


HEENT: Atraumatic, Normocephalic, PERRLA, Mucous membr. moist/pink, EOMI, 

Sclerae nonicteric


Neck: Supple, JVD not distended


Respiratory: Clear to auscultation bilaterally, Normal air movement


Cardiovascular: Regular rate/rhythm, Normal S1 S2, No murmurs


Gastrointestinal: Soft and benign, Non-distended, No tenderness


Musculoskeletal: No swelling


Integumentary: No rashes, No erythema, No cyanosis


Neurological: Normal speech, Normal strength at 5/5 x4 extr, Cranial nerves 3-12

 intact


Lymphatics: No axilla or inguinal lymphadenopathy





- Studies


Laboratory Data (last 24 hrs)





01/26/22 11:50: PT 11.0, INR 0.96, APTT 25.5


01/26/22 11:50: WBC 20.00 H, Hgb 10.7 L, Hct 32.7 L, Plt Count 137 L


01/26/22 11:50: Sodium 136, Potassium 3.9, BUN 14, Creatinine 0.74, Glucose 117 

H, Total Bilirubin 0.3, AST 15, ALT 32, Alkaline Phosphatase 162 H, Amylase 39, 

Lipase 39 L








Assessment and Plan





- Problems (Diagnosis)


(1) Sepsis


Current Visit: Yes   Status: Acute   





(2) Pneumonia


Current Visit: Yes   Status: Acute   





- Plan


Patient to the medical floor.


Sepsis protocol initiated in the ED.


Blood cultures obtained.


Continue IV hydration.


Will cover with broad-spectrum antibiotics as we wait for blood culture to 

result.


Pneumonia appears to be viral related.


Antibiotics will cover pneumonia.


COVID-19 is negative.


Supportive measures.


Monitor CBC to follow leukocytosis.


Monitor and optimize electrolytes.





- Advance Directives


Does patient have a Living Will: No


Does patient have a Durable POA for Healthcare: No

## 2022-01-26 NOTE — RAD REPORT
EXAM DESCRIPTION:  RAD - Chest Single View - 1/26/2022 12:44 pm

 

CLINICAL HISTORY:  CONGESTION

Chest pain.

 

COMPARISON:  Chest Single View dated 12/8/2021; Chest Pa And Lat (2 Views) dated 11/9/2021; Breast Bi
lat W Wo Cont dated 11/4/2021

 

FINDINGS:  Portable technique limits examination quality.

 

Subtle interstitial prominence bilaterally is seen which could indicate a viral infection. The heart 
is upper limit normal in size. Left-sided port catheter its tip in the SVC. Recent losses

## 2022-01-27 VITALS — OXYGEN SATURATION: 96 %

## 2022-01-27 VITALS — SYSTOLIC BLOOD PRESSURE: 118 MMHG | DIASTOLIC BLOOD PRESSURE: 62 MMHG

## 2022-01-27 VITALS — TEMPERATURE: 99 F

## 2022-01-27 LAB
ALBUMIN SERPL BCP-MCNC: 2.8 G/DL (ref 3.4–5)
ALP SERPL-CCNC: 140 U/L (ref 45–117)
ALT SERPL W P-5'-P-CCNC: 28 U/L (ref 12–78)
AST SERPL W P-5'-P-CCNC: 16 U/L (ref 15–37)
BUN BLD-MCNC: 15 MG/DL (ref 7–18)
GLUCOSE SERPLBLD-MCNC: 90 MG/DL (ref 74–106)
HCT VFR BLD CALC: 30.9 % (ref 36–45)
LYMPHOCYTES # SPEC AUTO: 0.6 K/UL (ref 0.7–4.9)
PMV BLD: 6.2 FL (ref 7.6–11.3)
POTASSIUM SERPL-SCNC: 4.3 MMOL/L (ref 3.5–5.1)
RBC # BLD: 3.33 M/UL (ref 3.86–4.86)

## 2022-01-27 RX ADMIN — SODIUM CHLORIDE SCH: 0.9 INJECTION, SOLUTION INTRAVENOUS at 10:40

## 2022-01-27 RX ADMIN — SODIUM CHLORIDE SCH MLS: 9 INJECTION, SOLUTION INTRAVENOUS at 10:11

## 2022-01-27 RX ADMIN — Medication SCH ML: at 09:00

## 2022-01-27 RX ADMIN — SODIUM CHLORIDE SCH MLS: 9 INJECTION, SOLUTION INTRAVENOUS at 01:00

## 2022-01-27 RX ADMIN — SODIUM CHLORIDE SCH MLS: 0.9 INJECTION, SOLUTION INTRAVENOUS at 02:40

## 2022-01-27 RX ADMIN — ENOXAPARIN SODIUM SCH MG: 40 INJECTION SUBCUTANEOUS at 10:12

## 2022-01-27 NOTE — P.DS
Admission Date: 01/26/22


Discharge Date: 01/27/22


Disposition: ROUTINE DISCHARGE


Discharge Condition: FAIR


Reason for Admission: Fever





- Problems


(1) Sepsis


Current Visit: Yes   Status: Acute   





(2) Pneumonia


Current Visit: Yes   Status: Acute   


Brief History of Present Illness: 


61-year-old old woman with a history of breast cancer, receiving chemotherapy 

presented to the emergency department with a complaint of fever, nonproductive 

cough and general malaise.  Symptoms have been present for about a couple of 

days.  Blood work done in the emergency department demonstrated leukocytosis 

with WBC of 20,000.  Temperature of 99.4 recorded in the ED. Sepsis screen: 

Lactate within normal limits, UA shows no evidence of UTI, chest x-ray 

demonstrated bilateral infiltrate suggestive of viral pneumonia.  She tested 

negative for COVID-19.  There is a concern for sepsis given elevated WBC in the 

context of immunosuppression from chemo.  Patient is hospitalized for further 

evaluation and management.





Hospital Course: 


Placed on observation on the medical floor and treated with broad-spectrum IV 

antibiotics-IV vancomycin and IV cefepime.


Blood culture yielded no growth.  WBC count trended down.  Patient was afebrile 

and asymptomatic throughout the hospital stay.


She requested to go home.  No source of infection identified except viral 

pneumonia patented chest x-ray.


Will discharge patient per her request with oral Augmentin.





Vital Signs/Physical Exam: 














Temp Pulse Resp BP Pulse Ox


 


 97.9 F   87   24 H  118/62   99 


 


 01/26/22 18:42  01/27/22 12:00  01/27/22 12:00  01/27/22 12:00  01/27/22 12:00








General: Alert, In no apparent distress, Oriented x3


HEENT: Mucous membr. moist/pink


Neck: JVD not distended


Respiratory: Clear to auscultation bilaterally, Normal air movement


Cardiovascular: No edema, Regular rate/rhythm, Normal S1 S2


Gastrointestinal: Normal bowel sounds, Soft and benign, Non-distended, No 

tenderness


Musculoskeletal: No swelling


Integumentary: No rashes


Neurological: Normal strength at 5/5 x4 extr


Laboratory Data at Discharge: 














WBC  17.00 K/uL (4.3-10.9)  H D 01/27/22  02:16    


 


Hgb  10.1 g/dL (12.0-15.0)  L  01/27/22  02:16    


 


Hct  30.9 % (36.0-45.0)  L  01/27/22  02:16    


 


Plt Count  135 K/uL (152-406)  L  01/27/22  02:16    


 


PT  11.0 SECONDS (9.5-12.5)   01/26/22  11:50    


 


INR  0.96   01/26/22  11:50    


 


APTT  25.5 SECONDS (24.3-36.9)   01/26/22  11:50    


 


Sodium  139 mmol/L (136-145)   01/27/22  02:16    


 


Potassium  4.3 mmol/L (3.5-5.1)   01/27/22  02:16    


 


BUN  15 mg/dL (7-18)   01/27/22  02:16    


 


Creatinine  0.63 mg/dL (0.55-1.3)   01/27/22  02:16    


 


Glucose  90 mg/dL ()   01/27/22  02:16    


 


Total Bilirubin  0.3 mg/dL (0.2-1.0)   01/27/22  02:16    


 


AST  16 U/L (15-37)   01/27/22  02:16    


 


ALT  28 U/L (12-78)   01/27/22  02:16    


 


Alkaline Phosphatase  140 U/L ()  H  01/27/22  02:16    


 


Amylase  39 U/L ()   01/26/22  11:50    


 


Lipase  39 U/L ()  L  01/26/22  11:50    








Home Medications: 








Simvastatin 20 mg PO BEDTIME 11/09/21 


Venlafaxine HCl [Effexor] 1 tab PO BEDTIME 11/09/21 


Amox/Clavulanate [Augmentin 875-125 Tab] 1 each PO BID #14 tab 01/27/22 





New Medications: 


Amox/Clavulanate [Augmentin 875-125 Tab] 1 each PO BID #14 tab


Diet: AHA


Activity: Ad sydnie


Followup: 


Earl Monteiro DO [Primary Care Provider] - 1 Week

## 2022-02-11 ENCOUNTER — HOSPITAL ENCOUNTER (EMERGENCY)
Dept: HOSPITAL 97 - ER | Age: 62
Discharge: HOME | End: 2022-02-11
Payer: COMMERCIAL

## 2022-02-11 VITALS — DIASTOLIC BLOOD PRESSURE: 60 MMHG | SYSTOLIC BLOOD PRESSURE: 116 MMHG

## 2022-02-11 VITALS — TEMPERATURE: 98.4 F | OXYGEN SATURATION: 100 %

## 2022-02-11 DIAGNOSIS — E78.00: ICD-10-CM

## 2022-02-11 DIAGNOSIS — D70.9: Primary | ICD-10-CM

## 2022-02-11 LAB
ALBUMIN SERPL BCP-MCNC: 3.3 G/DL (ref 3.4–5)
ALP SERPL-CCNC: 114 U/L (ref 45–117)
ALT SERPL W P-5'-P-CCNC: 29 U/L (ref 12–78)
AST SERPL W P-5'-P-CCNC: 16 U/L (ref 15–37)
BLD SMEAR INTERP: (no result)
BUN BLD-MCNC: 11 MG/DL (ref 7–18)
GLUCOSE SERPLBLD-MCNC: 92 MG/DL (ref 74–106)
HCT VFR BLD CALC: 32.5 % (ref 36–45)
LYMPHOCYTES # SPEC AUTO: 0.6 K/UL (ref 0.7–4.9)
MORPHOLOGY BLD-IMP: (no result)
PMV BLD: 6.6 FL (ref 7.6–11.3)
POTASSIUM SERPL-SCNC: 3.7 MMOL/L (ref 3.5–5.1)
RBC # BLD: 3.49 M/UL (ref 3.86–4.86)

## 2022-02-11 PROCEDURE — 87086 URINE CULTURE/COLONY COUNT: CPT

## 2022-02-11 PROCEDURE — 81003 URINALYSIS AUTO W/O SCOPE: CPT

## 2022-02-11 PROCEDURE — 71045 X-RAY EXAM CHEST 1 VIEW: CPT

## 2022-02-11 PROCEDURE — 87088 URINE BACTERIA CULTURE: CPT

## 2022-02-11 PROCEDURE — 99283 EMERGENCY DEPT VISIT LOW MDM: CPT

## 2022-02-11 PROCEDURE — 83605 ASSAY OF LACTIC ACID: CPT

## 2022-02-11 PROCEDURE — 36415 COLL VENOUS BLD VENIPUNCTURE: CPT

## 2022-02-11 PROCEDURE — 80053 COMPREHEN METABOLIC PANEL: CPT

## 2022-02-11 PROCEDURE — 85025 COMPLETE CBC W/AUTO DIFF WBC: CPT

## 2022-02-11 NOTE — RAD REPORT
EXAM DESCRIPTION:  RAD - Chest Single View - 2/11/2022 2:58 pm

 

CLINICAL HISTORY:  COUGH

 

COMPARISON:  Portable chest 01/26/2022

 

TECHNIQUE:  AP portable chest image was obtained 2/11/2022 2:58 pm .

 

FINDINGS:  Lungs are clear. Lung parenchymal pattern matches comparison. Left-sided Port-A-Cath remai
ns in place. Heart and vasculature are normal. No measurable pleural effusion and no pneumothorax. No
 acute bony abnormality seen. No acute aortic findings suspected.

 

IMPRESSION:  No acute cardiopulmonary process.

 

No significant change from comparison study.

## 2022-02-11 NOTE — ER
Nurse's Notes                                                                                     

 [facility]                                                                 

Name: Megan Coronado                                                                                   

Age: 61 yrs                                                                                       

Sex: Female                                                                                       

: 1960                                                                                   

MRN: [id number]                                                                                   

Arrival Date: 2022                                                                          

Time: 13:44                                                                                       

Account#: [id number]                                                                            

Bed 8                                                                                             

Private MD:                                                                                       

Diagnosis: Neutropenia, unspecified                                                               

                                                                                                  

Presentation:                                                                                     

                                                                                             

13:54 Chief complaint: Patient states: Sent in by Cancer center doctor for low WBC, labs      ll1 

      drawn yesterday (labs drawn 1 week after chemo). Coronavirus screen: Client denies          

      travel out of the [place] in the last 14 days. Coronavirus screen: Vaccine status: Patient     

      reports being unvaccinated. At this time, the client does not indicate any symptoms         

      associated with coronavirus-19. Ebola Screen: Patient denies travel to an                   

      Ebola-affected area in the 21 days before illness onset. Ebola Screen: Patient denies       

      travel to an Ebola-affected area in the 21 days before illness onset. Initial Sepsis        

      Screen: Does the patient meet any 2 criteria? No. Patient's initial sepsis screen is        

      negative. Does the patient have a suspected source of infection? No. Patient's initial      

      sepsis screen is negative. Risk Assessment: Do you want to hurt yourself or someone         

      else? Patient reports no desire to harm self or others. Onset of symptoms is unknown.       

13:54 Method Of Arrival: Ambulatory                                                           ll1 

13:54 Acuity: CECIL 3                                                                           ll1 

                                                                                                  

Triage Assessment:                                                                                

13:57 General: Appears in no apparent distress. Behavior is calm, cooperative, appropriate    ll1 

      for age. Pain: Denies pain. Neuro: No deficits noted. Cardiovascular: No deficits           

      noted. Respiratory: No deficits noted.                                                      

                                                                                                  

Historical:                                                                                       

- Allergies:                                                                                      

13:57 No Known Allergies;                                                                     ll1 

- PMHx:                                                                                           

13:57 breast cancer; high cholesteral;                                                        ll1 

- PSHx:                                                                                           

13:57 Lumpectomy of breast;                                                                   ll1 

                                                                                                  

- Immunization history:: Client reports having NOT received the Covid vaccine. Flu                

  vaccine status is unknown.                                                                      

- Social history:: Smoking status: Patient denies any tobacco usage or history of.                

                                                                                                  

                                                                                                  

Screenin:18 Abuse screen: Denies threats or abuse. Nutritional screening: No deficits noted.        jh6 

      Tuberculosis screening: No symptoms or risk factors identified. Fall Risk None              

      identified.                                                                                 

                                                                                                  

Assessment:                                                                                       

18:18 General: Appears in no apparent distress. Behavior is calm, cooperative. Pain: Denies   jh6 

      pain.                                                                                       

                                                                                                  

Vital Signs:                                                                                      

13:54  / 65; Pulse 87; Resp 17; Temp 98.4; Pulse Ox 100% ; Weight 88 kg; Height 5 ft. 5 ll1 

      in. (165.10 cm); Pain 0/10;                                                                 

19:26  / 60; Pulse 70; Resp 14; Temp 98.4; Pulse Ox 100% on R/A; Pain 0/10;             st1 

13:54 Body Mass Index 32.28 (88.00 kg, 165.10 cm)                                             1 

                                                                                                  

ED Course:                                                                                        

13:44 Patient arrived in ED.                                                                  rg4 

13:53 Ky Amezquita MD is Attending Physician.                                             [facility] 

13:57 Triage completed.                                                                       1 

13:58 Arm band placed on.                                                                     1 

14:58 Chest Single View XRAY In Process Unspecified.                                          [facility]

17:31 Chacha Tenorio, RN is Primary Nurse.                                                 [facility] 

18:18 Bed in low position. Call light in reach. Side rails up X 1. Adult w/ patient.          [facility] 

18:18 Initial lab(s) drawn, by me, sent to lab. Urine collected: clean catch specimen, clear. [facility] 

18:40 Helena Charles MD is Referral Physician.                                      [facility] 

19:08 Primary Nurse role handed off by Chacha Tenorio, TYRA                                  [facility] 

19:17 Yi Veronica, TYRA is Primary Nurse.                                                   st1 

19:28 No provider procedures requiring assistance completed.                                  st1 

19:29 Patient did not have IV access during this emergency room visit.                        st1 

                                                                                                  

Administered Medications:                                                                         

18:18 Not Given (no iv at this timee): NS 0.9% 500 ml IV at bolus once                        [facility] 

18:40 CANCELLED (Duplicate Order): Neupogen 480 mcg Sub-Q once; GIVE IF anc IS LESS THAN 500  adrian 

                                                                                                  

                                                                                                  

Outcome:                                                                                          

18:40 Discharge ordered by MD.                                                                [facility] 

19:28 Discharged to home                                                                      st1 

19:28 Discharged to home ambulatory, with significant other.                                      

19:28 Condition: good                                                                             

19:28 Discharge instructions given to patient, family, Instructed on discharge instructions,      

      follow up and referral plans. Demonstrated understanding of instructions, follow-up         

      care.                                                                                       

19:29 Patient left the ED.                                                                    st1 

                                                                                                  

Signatures:                                                                                       

Dispatcher [employer]                           EDMS                                                 

Ky Amezquita MD MD cha Garcia, Rubi                                 rg4                                                  

Wili, Lynsay, RN                       RN   ll1                                                  

Joy Garay                          9                                                  

Chacha Tenorio, RN                   RN   jh6                                                  

Yi Veronica RN                     RN   st1                                                  

                                                                                                  

**************************************************************************************************

## 2022-02-11 NOTE — EDPHYS
Physician Documentation                                                                           

 [facility]                                                                 

Name: Megan Coronado                                                                                   

Age: 61 yrs                                                                                       

Sex: Female                                                                                       

: 1960                                                                                   

MRN: [id number]                                                                                   

Arrival Date: 2022                                                                          

Time: 13:44                                                                                       

Account#: [id number]                                                                            

Bed 8                                                                                             

Private MD:                                                                                       

ALEX Physician Ky Amzequita                                                                      

HPI:                                                                                              

                                                                                             

18:02 This 61 yrs old  Female presents to ER via Ambulatory with complaints of       adrian 

      Abnormal Lab Results.                                                                       

18:02 sent for cbc , ANC LESS THAN 100 2 DAYS AGO , ID=F LESS THAN 500 TODAY GIVE 480N        adrian 

      GRANIX. Onset: The symptoms/episode began/occurred today. Severity of symptoms: At          

      their worst the symptoms were very mild in the emergency department the symptoms are        

      unchanged.                                                                                  

                                                                                                  

Historical:                                                                                       

- Allergies:                                                                                      

13:57 No Known Allergies;                                                                     ll1 

- PMHx:                                                                                           

13:57 breast cancer; high cholesteral;                                                        ll1 

- PSHx:                                                                                           

13:57 Lumpectomy of breast;                                                                   ll1 

                                                                                                  

- Immunization history:: Client reports having NOT received the Covid vaccine. Flu                

  vaccine status is unknown.                                                                      

- Social history:: Smoking status: Patient denies any tobacco usage or history of.                

                                                                                                  

                                                                                                  

ROS:                                                                                              

18:05 Constitutional: Negative for fever, chills, and weight loss, Eyes: Negative for injury, adrian 

      pain, redness, and discharge, ENT: Negative for injury, pain, and discharge, Neck:          

      Negative for injury, pain, and swelling, Cardiovascular: Negative for chest pain,           

      palpitations, and edema, Respiratory: Negative for shortness of breath, cough,              

      wheezing, and pleuritic chest pain, Abdomen/GI: Negative for abdominal pain, nausea,        

      vomiting, diarrhea, and constipation, Back: Negative for injury and pain, : Negative      

      for injury, bleeding, discharge, and swelling, MS/Extremity: Negative for injury and        

      deformity, Skin: Negative for injury, rash, and discoloration, Neuro: Negative for          

      headache, weakness, numbness, tingling, and seizure, Psych: Negative for depression,        

      anxiety, suicide ideation, homicidal ideation, and hallucinations, Allergy/Immunology:      

      Negative for hives, rash, and allergies, Endocrine: Negative for neck swelling,             

      polydipsia, polyuria, polyphagia, and marked weight changes, Hematologic/Lymphatic:         

      Negative for swollen nodes, abnormal bleeding, and unusual bruising.                        

                                                                                                  

Exam:                                                                                             

18:05 Constitutional:  This is a well developed, well nourished patient who is awake, alert,  adrian 

      and in no acute distress. Head/Face:  Normocephalic, atraumatic. Eyes:  Pupils equal        

      round and reactive to light, extra-ocular motions intact.  Lids and lashes normal.          

      Conjunctiva and sclera are non-icteric and not injected.  Cornea within normal limits.      

      Periorbital areas with no swelling, redness, or edema. ENT:  Nares patent. No nasal         

      discharge, no septal abnormalities noted.  Tympanic membranes are normal and external       

      auditory canals are clear.  Oropharynx with no redness, swelling, or masses, exudates,      

      or evidence of obstruction, uvula midline.  Mucous membranes moist. Neck:  Trachea          

      midline, no thyromegaly or masses palpated, and no cervical lymphadenopathy.  Supple,       

      full range of motion without nuchal rigidity, or vertebral point tenderness.  No            

      Meningismus. Chest/axilla:  Normal chest wall appearance and motion.  Nontender with no     

      deformity.  No lesions are appreciated. Cardiovascular:  Regular rate and rhythm with a     

      normal S1 and S2.  No gallops, murmurs, or rubs.  Normal PMI, no JVD.  No pulse             

      deficits. Respiratory:  Lungs have equal breath sounds bilaterally, clear to                

      auscultation and percussion.  No rales, rhonchi or wheezes noted.  No increased work of     

      breathing, no retractions or nasal flaring. Abdomen/GI:  Soft, non-tender, with normal      

      bowel sounds.  No distension or tympany.  No guarding or rebound.  No evidence of           

      tenderness throughout. Back:  No spinal tenderness.  No costovertebral tenderness.          

      Full range of motion. Female :  Normal external genitalia. Skin:  Warm, dry with          

      normal turgor.  Normal color with no rashes, no lesions, and no evidence of cellulitis.     

      MS/ Extremity:  Pulses equal, no cyanosis.  Neurovascular intact.  Full, normal range       

      of motion. Neuro:  Awake and alert, GCS 15, oriented to person, place, time, and            

      situation.  Cranial nerves II-XII grossly intact.  Motor strength 5/5 in all                

      extremities.  Sensory grossly intact.  Cerebellar exam normal.  Normal gait. Psych:         

      Awake, alert, with orientation to person, place and time.  Behavior, mood, and affect       

      are within normal limits.                                                                   

                                                                                                  

Vital Signs:                                                                                      

13:54  / 65; Pulse 87; Resp 17; Temp 98.4; Pulse Ox 100% ; Weight 88 kg; Height 5 ft. 5 ll1 

      in. (165.10 cm); Pain 0/10;                                                                 

19:26  / 60; Pulse 70; Resp 14; Temp 98.4; Pulse Ox 100% on R/A; Pain 0/10;             st1 

13:54 Body Mass Index 32.28 (88.00 kg, 165.10 cm)                                             ll1 

                                                                                                  

MDM:                                                                                              

17:34 Patient medically screened.                                                             adrian 

18:05 Differential Diagnosis sepsis. Data reviewed: vital signs, nurses notes, lab test       adrian 

      result(s), radiologic studies. Data interpreted: Cardiac monitor: rate is 87 beats/min,     

      rhythm is regular, Pulse oximetry: on room air is 100 %. Test interpretation: by ED         

      physician or midlevel provider: plain radiologic studies. Counseling: I had a detailed      

      discussion with the patient and/or guardian regarding: the historical points, exam          

      findings, and any diagnostic results supporting the discharge/admit diagnosis, lab          

      results, radiology results, the need for outpatient follow up, for definitive care, an      

      internist.                                                                                  

                                                                                                  

                                                                                             

13:54 Order name: CBC with Diff; Complete Time: 18:38                                         [facility] 

                                                                                             

13:54 Order name: Comprehensive Metabolic Panel                                               [facility] 

                                                                                             

13:54 Order name: Urine Culture                                                               [facility] 

                                                                                             

13:54 Order name: Lactate; Complete Time: 19:03                                               [facility] 

                                                                                             

18:13 Order name: Urine Dipstick-Ancillary; Complete Time: 18:16                              [facility]

                                                                                             

13:54 Order name: Chest Single View XRAY; Complete Time: 17:53                                [facility] 

                                                                                             

13:54 Order name: Urine Dipstick-Ancillary (obtain specimen); Complete Time: 18:17            [facility] 

                                                                                             

18:23 Order name: CBC Smear Scan; Complete Time: 18:38                                        [facility]

                                                                                                  

Administered Medications:                                                                         

18:18 Not Given (no iv at this timee): NS 0.9% 500 ml IV at bolus once                        [facility] 

18:40 CANCELLED (Duplicate Order): Neupogen 480 mcg Sub-Q once; GIVE IF anc IS LESS THAN 500  adrian 

                                                                                                  

                                                                                                  

Disposition Summary:                                                                              

22 18:40                                                                                    

Discharge Ordered                                                                                 

      Location: Home                                                                          adrian 

      Problem: new                                                                            adrian 

      Symptoms: have improved                                                                 adrian 

      Condition: Stable                                                                       adrian 

      Diagnosis                                                                                   

        - Neutropenia, unspecified                                                            adrian 

      Followup:                                                                               adrian 

        - With: Private Physician                                                                  

        - When: 2 - 3 days                                                                         

        - Reason: Recheck today's complaints, Continuance of care, Re-evaluation by your           

      physician                                                                                   

      Followup:                                                                               adrian 

        - With:                                                                                    

        - When: 2 - 3 days                                                                         

        - Reason: Recheck today's complaints, Continuance of care, Re-evaluation by your           

      physician                                                                                   

      Discharge Instructions:                                                                     

        - Discharge Summary Sheet                                                             adrian 

        - Neutropenia                                                                         adrian 

        - Neutropenic Fever                                                                   adrian 

      Forms:                                                                                      

        - Medication Reconciliation Form                                                      adrian 

        - Thank You Letter                                                                    adrian 

        - Antibiotic Education                                                                adrian 

        - Prescription Opioid Use                                                             adrian 

Signatures:                                                                                       

Dispatcher [employer]                           EDKy Bragg MD MD cha Lewis, Lynsay RN                       RN   ll1                                                  

Chacha Tenorio RN   [id number]                                                  

                                                                                                  

Corrections: (The following items were deleted from the chart)                                    

18:40 18:16 Neupogen 480 mcg Sub-Q once; GIVE IF anc IS LESS THAN 500 ordered. adrian campbell 

                                                                                                  

**************************************************************************************************

## 2022-02-11 NOTE — XMS REPORT
Continuity of Care Document

                          Created on:2022



Patient:TOMI MEHTA

Sex:Female

:1960

External Reference #:[id number]





Demographics







                          Address                   [address] [place] [address]

 

                          Home Phone                [phone]

 

                          Mobile Phone              [phone]

 

                          Email Address             [email or web address]

 

                          Preferred Language        English

 

                          Marital Status            Unknown

 

                          Hinduism Affiliation     Unknown

 

                          Race                      Unknown

 

                          Additional Race(s)        Unavailable



                                                    Unavailable

 

                          Ethnic Group              Unknown









Author







                          Organization              [facility]

t

 

                          Address                   [address]



                                                    [place] [address]

 

                          Phone                     [phone]









Support







                Name            Relationship    Address         Phone

 

                Jose          Unavailable     [address] [facility] [address] [phone]



                                                [place] [address] 

 

                Jose          Unavailable     [address] [facility] [phone]



                                                [place] [address] 









Care Team Providers







                    Name                Role                Phone

 

                    ABDULAZIZ Monteiro           Attending Clinician Unavailable









Payers







           Payer Name Policy Type Policy Number Effective Date Expiration Date S

eriberto

 

           [employer] PPO I            [id number]  2010 00:00:00            







Problems

This patient has no known problems.



Allergies, Adverse Reactions, Alerts







       Allergy Allergy Status Severity Reaction(s) Onset  Inactive Treating Comm

ents 

Source



       Name   Type                        Date   Date   Clinician        

 

       NO KNOWN Drug   Active                                           [facility]



       ALLERGIE Class                                                   ity of



       [facility]







Medications







       Ordered Filled Start  Stop   Current Ordering Indication Dosage Frequency

 Signature

                    Comments            Components          Source



     Medication Medication Date Date Medication? Clinician                (SIG) 

          



     Name Name                                                   

 

     Venlafaxine Venlafaxine           Yes  Earl                1 tablet      

     [facility]



     HCl  HCl                 Monteiro                with food           [facility]

 

     Simvastatin Simvastatin           Yes  Earl                1 tablet      

     [facility]



                              Monteiro                               [facility]



                                                                 l



                                                                 [facility]



                                                                 ent



                                                                 Clinics







Immunizations







           Ordered    Filled Immunization Date       Status     Comments   Sourc

e



           Immunization Name Name                                        

 

           TDAP > 7   TDAP > 7   2020 Completed             [facility]



           Years-Adacel Years-Adacel 00:00:00                         [facility]



                                                                  Clinics

 

           Afluria single dose Afluria single dose 2019 Completed         

    [facility]



                                 00:00:00                         [facility]



                                                                  Clinics







Procedures

This patient has no known procedures.



Encounters







        Start   End     Encounter Admission Attending Care    Care    Encounter 

Source



        Date/Time Date/Time Type    Type    Clinicians Facility Department ID   

   

 

        2022         Outpatient         Monteiro,  [facility]  [facility]  [id number]

 [facility]



        14:03:50                         Earl                  [id number]   [facility]



                                                                        [facility]pati



                                                                        ent



                                                                        Clinics

 

        2022         Outpatient         Monteiro,  [facility]  

 [facility]



        13:44:09                         Earl                  [id number]   [facility]



                                                                        [facility]



                                                                        [facility]pati



                                                                        ent



                                                                        Clinics

 

        2022         Outpatient         Monteiro,  [facility]  

 [facility]



        13:13:59                         Earl                  [id number]   [facility]



                                                                        [facility]pati



                                                                        ent



                                                                        Clinics

 

        2022         Outpatient         Monteiro,  [facility]  

 [facility]



        13:09:08                         Earl                  [id number]   [facility]



                                                                        [facility]pati



                                                                        ent



                                                                        Clinics

 

        2022         Outpatient         Monteiro,  [facility]  

 [facility]



        13:08:53                         Earl                  [id number]   [facility]



                                                                        [facility]pati



                                                                        ent



                                                                        Clinics

 

        2022         Outpatient         Monteiro,  [facility]  

 [facility]



        12:53:54                         Earl                  [id number]   [facility]



                                                                        [facility]pati



                                                                        ent



                                                                        Clinics

 

        2022         Outpatient         Monteiro,  [facility]  

 [facility]



        12:53:25                         Earl                  [id number]   [facility]



                                                                        [facility]pati



                                                                        ent



                                                                        Clinics

 

        2022         Outpatient         Monteiro,  [facility]  

 [facility]



        12:41:09                         Earl                  [id number]   [facility]



                                                                        [facility]i



                                                                        ent



                                                                        Clinics

 

        2022         Outpatient         Monteiro,  [facility]  

 [facility]



        12:40:48                         Earl                  [id number]   [facility]



                                                                        [facility]pati



                                                                        ent



                                                                        Clinics

 

        2022         Outpatient         Monteiro,  [facility]  

 [facility]



        12:25:15                         Earl                  [id number]   [facility]



                                                                        [facility]pati



                                                                        ent



                                                                        Clinics

 

        2022         Outpatient         Monteiro,  [facility]  

 [facility]



        12:01:54                         Earl                  [id number]   [facility]



                                                                        [facility]i



                                                                        ent



                                                                        Clinics

 

        2022         Outpatient         Monteiro,  [facility]  

 [facility]



        12:01:36                         Earl                  [id number]   [facility]



                                                                        [facility]pati



                                                                        ent



                                                                        Clinics

 

        2022         Outpatient         Monteiro,  [facility]  

 [facility]



        11:54:59                         Earl                  [id number]   [facility]



                                                                        [facility]i



                                                                        ent



                                                                        Clinics

 

        2022         Outpatient         Monteiro,  [facility]  [facility]  

 [facility]



        11:26:43                         Earl                  [id number]   [facility]



                                                                        [facility]i



                                                                        ent



                                                                        Clinics

 

        2022         Outpatient         Monteiro,  [facility]  [facility]  

 [facility]



        11:13:34                         Earl                  [id number]   [facility]



                                                                        [facility]



                                                                        ent



                                                                        Clinics

 

        2021-11-10 2021-11-10 ambulatory                 [facility]  [facility]  [id number]

 [facility]



        00:00[facility]00 00[facility]00:00                                                 [facility]



                                                                        [facility]



                                                                        ent



                                                                        Clinics

 

        2021-10-28 2021-10-28 ambulatory                 [facility]  [facility]  [id number]

 [facility]



        00[facility]00[facility] 00[facility]00:00                                                 [facility]



                                                                        [facility]



                                                                        ent



                                                                        Clinics

 

        2021-10-21 2021-10-21 Outpatient                 [facility]  [facility]  [id number]

 [facility]



        00[facility]00[facility] 00[facility]00[facility]00                                                 [facility]



                                                                        [facility]



                                                                        ent



                                                                        Clinics

 

        2021-10-21 2021-10-21 Outpatient                 [facility]  [facility]  [id number]

 [facility]



        00[facility]00[facility]00 00[facility]00:00                                                 [facility]



                                                                        [facility]



                                                                        ent



                                                                        Clinics

 

        2021-09-15 2021-09-15 Outpatient                 [facility]  [facility]  [id number]

 [facility]



        00:00[facility]00 00[facility]00:00                                                 [facility]



                                                                        [facility]



                                                                        ent



                                                                        Clinics

 

        2021 Outpatient                 [facility]  [facility]  [id number]

 [facility]



        00:00[facility]00 00[facility]00:00                                                 [facility]



                                                                        [facility]



                                                                        ent



                                                                        Clinics

 

        2021 Outpatient                 [facility]  [facility]  [id number]

 [facility]



        00:00:00 00:00:00                                                 [facility]



                                                                        [facility]i



                                                                        ent



                                                                        Clinics

 

        2021 Outpatient                 [facility]  [facility]  [id number]

 [facility]



        00:00:00 00:00:00                                                 [facility]



                                                                        [facility]i



                                                                        ent



                                                                        Clinics

 

        2021 Outpatient                 [facility]  [facility]  [id number]

 [facility]



        00:00[facility]00 00[facility]00:00                                                 [facility]



                                                                        [facility]i



                                                                        ent



                                                                        Clinics

 

        2021 Outpatient                 [facility]  [facility]  [id number]

 [facility]



        00:00[facility]00 00[facility]00:00                                                 [facility]



                                                                        [facility]



                                                                        ent



                                                                        Clinics

 

        2021 Outpatient                 [facility]  [facility]  [id number]

 [facility]



        00:00:00 00[facility]00:00                                                 [facility]



                                                                        [facility]



                                                                        [facility]



                                                                        ent



                                                                        Clinics

 

        2021 Outpatient                 [facility]  [facility]  [id number]

 [facility]



        00:00:00 00:00:00                                                 [facility]



                                                                        [facility]



                                                                        [facility]i



                                                                        ent



                                                                        Clinics

 

        2021 Outpatient                 [facility]  [facility]  [id number]

 [facility]



        00:00:00 00:00:00                                                 [facility]



                                                                        [facility]i



                                                                        ent



                                                                        Clinics

 

        2021 Outpatient                 [facility]  [facility]  [id number]

 [facility]



        00:00:00 00:00:00                                                 [facility]



                                                                        [facility]



                                                                        [facility]i



                                                                        ent



                                                                        Clinics

 

        2021 Outpatient                 [facility]  [facility]  [id number]

 [facility]



        00:00:00 00:00:00                                                 [facility]



                                                                        [facility]i



                                                                        ent



                                                                        Clinics

 

        2021 Outpatient                 [facility]  [facility]  [id number]

 [facility]



        00:00:00 00:00:00                                                 [facility]



                                                                        [facility]i



                                                                        ent



                                                                        Clinics

 

        2021 Outpatient                 [facility]  [facility]  [id number]

 [facility]



        00:00:00 00[facility]00:00                                                 [facility]



                                                                        [facility]i



                                                                        ent



                                                                        Clinics

 

        2021-03-10 2021-03-10 Outpatient                 [facility]  [facility]  [id number]

 [facility]



        00:00:00 00[facility]00:00                                                 [facility]



                                                                        [facility]



                                                                        [facility]i



                                                                        ent



                                                                        Clinics

 

        2021 Outpatient                 [facility]  [facility]  [id number]

 [facility]



        00:00:00 00:00:00                                                 [facility]



                                                                        l



                                                                        Outpati



                                                                        ent



                                                                        Clinics

 

        2021 Outpatient                 STLMLC  STLMLC  [id number]

 [facility]



        [facility]00 [facility]00                                                 [facility]



                                                                        [facility]



                                                                        Outpati



                                                                        ent



                                                                        Clinics

 

        2020 Emergency X               [facility]    ERT     [id number]

[facility]



        14:25:00 14:25:00                                                 ity of



                                                                        [facility]

 

        2020 Outpatient                 STLMLC  STLMLC  [id number]

 [facility]



        [facility]00 [facility]00                                                 [facility]



                                                                        l



                                                                        Outpati



                                                                        ent



                                                                        Clinics

 

        2020-12-10 2020-12-10 Outpatient                 STLMLC  STLC  [id number]

 [facility]



        [facility]00                                                 [facility]



                                                                        l



                                                                        Outpati



                                                                        ent



                                                                        Clinics

 

        2020 Outpatient                 STLMLC  STLC  [id number]

 [facility]



        00:[facility]00 [facility]00                                                 [facility]



                                                                        l



                                                                        Outpati



                                                                        ent



                                                                        Clinics

 

        2020 Outpatient                 STLMLC  STLMLC  [id number]

 [facility]



        00:00[facility]00 [facility]00                                                 [facility]



                                                                        l



                                                                        Outpati



                                                                        ent



                                                                        Clinics

 

        2020 Outpatient                 STLMLC  STLC  [id number]

 [facility]



        00:[facility]00 [facility]00[facility]00                                                 [facility]



                                                                        l



                                                                        Outpati



                                                                        ent



                                                                        Clinics

 

        2020-10-14 2020-10-14 Outpatient                 STLMLC  STLC  [id number]

 [facility]



        00:[facility]00 [facility]00                                                 [facility]



                                                                        l



                                                                        Outpati



                                                                        ent



                                                                        Clinics

 

        2020 Outpatient                 [facility]

[id number] [facility]



        09:30:00 09:30:00                         t [employer] [place]

s -



                                                Drive   [facility]



                                                Family  Medicine         l



                                                Medicine                 Outpati



                                                                        ent



                                                                        Clinics

 

        2020 Outpatient                 [facility] [address]

[id number] [facility]



        16:55:00 16:55:00                         [place]

s -



                                                Drive   [facility]



                                                Family  Medicine         l



                                                Medicine                 Outpati



                                                                        ent



                                                                        Clinics

 

        2020 Outpatient                 [facility] [address]

[id number] [facility]



        09:30:00 09:30:00                         [place]   [place]

s -



                                                Drive   [facility]  Medicine         l



                                                Medicine                 Outpati



                                                                        ent



                                                                        Clinics

 

        2019 Outpatient                 [facility] [address]

[id number] [facility]



        09:30:00 09:30:00                         t [employer] [place]

s -



                                                Drive   [facility]



                                                Medicine                 Outpati



                                                                        ent



                                                                        Clinics







Results

This patient has no known results.

## 2024-12-16 ENCOUNTER — HOSPITAL ENCOUNTER (EMERGENCY)
Dept: HOSPITAL 97 - ER | Age: 64
Discharge: HOME | End: 2024-12-16
Payer: COMMERCIAL

## 2024-12-16 VITALS — DIASTOLIC BLOOD PRESSURE: 81 MMHG | OXYGEN SATURATION: 99 % | TEMPERATURE: 98.4 F | SYSTOLIC BLOOD PRESSURE: 130 MMHG

## 2024-12-16 DIAGNOSIS — S62.637A: Primary | ICD-10-CM

## 2024-12-16 PROCEDURE — 99284 EMERGENCY DEPT VISIT MOD MDM: CPT

## 2024-12-16 PROCEDURE — 96372 THER/PROPH/DIAG INJ SC/IM: CPT

## 2024-12-16 PROCEDURE — 29130 APPL FINGER SPLINT STATIC: CPT

## 2024-12-16 PROCEDURE — 2W3KX1Z IMMOBILIZATION OF LEFT FINGER USING SPLINT: ICD-10-PCS

## 2024-12-16 PROCEDURE — 73140 X-RAY EXAM OF FINGER(S): CPT

## 2024-12-16 NOTE — EDPHYS
Physician Documentation                                                                           

 [facility]                                                                 

Name: Megan Coronado                                                                                   

Age: 64 yrs                                                                                       

Sex: Female                                                                                       

: 1960                                                                                   

MRN: [id number]                                                                                   

Arrival Date: 2024                                                                          

Time: 10:56                                                                                       

Account#: [id number]                                                                            

Bed 15                                                                                            

Private MD:                                                                                       

ED Physician Paewl Lackey                                                                         

HPI:                                                                                              

                                                                                             

11:25 This 64 yrs old Female presents to ER via Ambulatory with complaints of Finger Injury.  cp  

11:25 The patient or guardian complains of injury, a laceration, pain, that is acute. The     cp  

      complaints affect the left small finger. Context: resulted from a crush injury, between     

      wall and safe.                                                                              

11:25 Onset: The symptoms/episode began/occurred just prior to arrival. Treatment prior to    cp  

      arrival includes: applying pressure to the affected area. Associated signs and              

      symptoms: The patient has no apparent associated signs or symptoms.                         

                                                                                                  

Historical:                                                                                       

- Allergies:                                                                                      

11:25 No Known Allergies;                                                                     rs5 

- PMHx:                                                                                           

11:25 breast cancer; high cholesteral;                                                        rs5 

- PSHx:                                                                                           

11:25 Lumpectomy of breast;                                                                   rs5 

                                                                                                  

- Immunization history:: Last tetanus immunization: unknown.                                      

- Infectious Disease History:: Denies.                                                            

- Social history:: Smoking status: Patient denies any tobacco usage or history of.                

                                                                                                  

                                                                                                  

ROS:                                                                                              

11:30 Constitutional: history per hpi                                                         cp  

                                                                                                  

Exam:                                                                                             

11:33 Constitutional: The patient appears in no acute distress, alert, awake, well developed, cp  

      well nourished, uncomfortable,                                                              

11:33 Head/Face:  Normocephalic, atraumatic.                                                  cp  

11:33 Chest/axilla: Inspection: normal,                                                           

11:33 Cardiovascular: Rate: normal,                                                               

11:33 Respiratory: the patient does not display signs of respiratory distress,  Respirations:     

      normal, no use of accessory muscles, no retractions, Breath sounds: are clear               

      throughout,                                                                                 

11:33 Abdomen/GI: Inspection: abdomen appears normal,                                             

11:33 Musculoskeletal/extremity: Extremities: noted in the distal phalanx of left small           

      finger: laceration, pain, swelling, tenderness, There is no evidence of  nail injury as     

      nail appear intact w/o ecchymosis, Perfusion: the extremity is normally perfused            

      throughout, the  distal phalanx of left small finger decreased sensation,                   

                                                                                                  

Vital Signs:                                                                                      

11:20  / 84; Pulse 74; Resp 17; Temp 98(O); Pulse Ox 98% ;                              rs5 

12:00  / 82; Pulse 72; Resp 16; Pulse Ox 100% ;                                         me1 

13:00  / 90; Pulse 72; Resp 15; Pulse Ox 100% ;                                         me1 

13:45  / 81; Pulse 80; Resp 16; Temp 98.4; Pulse Ox 99% ;                               rs5 

                                                                                                  

MDM:                                                                                              

11:19 Medical Screening Exam initiated                                                          

13:47 Data reviewed: vital signs, nurses notes, radiologic studies, plain films.                

13:47 I considered the following discharge prescriptions or medication management in the        

      emergency department Medications were administered in the Emergency Department. See         

      MAR. Counseling: I had a detailed discussion with the patient and/or guardian regarding     

      the historical points, exam findings, and any diagnostic results supporting the             

      discharge/admit diagnosis, radiology results, the need for outpatient follow up, a hand     

      specialist, to return to the emergency department if symptoms worsen or persist or if       

      there are any questions or concerns that arise at home.                                     

                                                                                                  

                                                                                             

11:19 Order name: XRAY Finger-Thumb Left                                                      cp  

                                                                                             

13:46 Order name: Wound dressing; Complete Time: 14:00                                        cp  

                                                                                             

13:46 Order name: Finger Splint; Complete Time: 14:00                                         cp  

                                                                                                  

Administered Medications:                                                                         

11:20 Drug: Hydrocodone-Acetaminophen PO (7.5 mg-325 mg) 1 tabs PO once; RASS on ADMIN:       rs5 

      Combtv4, Very Agttd3, Agttd2, Rstlss1, AlertClm0, Drwsy-1, Lt Sdtn-2, Mod Sdtn-3, Dp        

      Sdtn-4, UnArsble-5 Route: PO;                                                               

12:22 Follow up: Response: No adverse reaction; Pain is decreased                             rs5 

13:42 Follow up: Response: No adverse reaction; Pain is decreased                             me1 

13:41 Drug: Bupivacaine Infiltration (0.5 %) 10 ml 10 ml Infiltration once {Note:             me1 

      Administered by LAURENT Winter} Volume: 10 ml; Route: Infiltration;                        

14:10 Follow up: Response: No adverse reaction                                                rs5 

13:42 Drug: Lidocaine Infiltration (1 %) 10 ml 5 ml Infiltration once; to bedside {Note:      me1 

      Administered by LAURENT Winter} Volume: 5 ml; Route: Infiltration;                         

14:05 Follow up: Response: No adverse reaction                                                rs5 

13:52 Drug: Boostrix Tdap IM 0.5 ml IM once; as a single dose Route: IM; Site: left deltoid;  me1 

14:01 Follow up: Response: No adverse reaction                                                rs5 

                                                                                                  

                                                                                                  

Disposition:                                                                                      

18:11 Co-signature as Attending Physician, Pawel RANDHAWA I reviewed the patient's care       rn  

      provided by the Advanced Practice Provider and agree with the diagnosis and treatment       

      plan.                                                                                       

                                                                                                  

Disposition Summary:                                                                              

24 13:48                                                                                    

Discharge Ordered                                                                                 

 Notes:       Location: Home                                                                        
  cp

      Problem: new                                                                            cp  

      Symptoms: have improved                                                                 cp  

      Condition: Stable                                                                       cp  

      Diagnosis                                                                                   

        - Displaced fracture of distal phalanx of finger - left little finger                 cp  

        - Laceration without foreign body of left little finger without damage to nail        cp  

      Followup:                                                                               cp  

        - With: Private Physician                                                                  

        - When: 1 week                                                                             

        - Reason: Wound Recheck                                                                    

      Discharge Instructions:                                                                     

        - Discharge Summary Sheet                                                             cp  

        - Finger Fracture, Adult                                                              cp  

        - Laceration Care, Adult                                                              cp  

      Forms:                                                                                      

        - Medication Reconciliation Form                                                      cp  

        - Antibiotic Education                                                                cp  

        - Prescription Opioid Use                                                             cp  

        - Patient Portal Instructions                                                         cp  

        - Leadership Thank You Letter                                                         cp  

      Prescriptions:                                                                              

        - Augmentin 875-125 mg Oral Tablet                                                         

            - take 1 tablet ORAL route every 12 hours for 10 days; 20 tablet; Refills: 0,     cp  

      Product Selection Permitted                                                                 

        - Tramadol 50 mg Oral Tablet                                                               

            - take 1 tablet ORAL route every 8 hours as needed; 12 tablet; Refills: 0,        cp  

      Product Selection Permitted                                                                 

Signatures:                                                                                       

Dispatcher [employer]                           Pawel Marcial MD MD rn Page, Corey, PA PA   cp                                                   

Marcos Kohli RN                       RN   rs5                                                  

Lizzie Daly RN                  RN   me1                                                  

                                                                                                  

**************************************************************************************************

## 2024-12-16 NOTE — RAD REPORT
EXAM: XR  Finger-Thumb Left



HISTORY:  [facility] MAIN

 crush injury

 Bed:



COMPARISON: None



TECHNIQUE: 3 radiographic views of the LEFT fingers submitted.



FINDINGS: Mildly displaced fracture at the tuft of the fifth digit distal phalanx anteriorly with abel
rounding soft tissue swelling. Joint alignment is maintained. No significant degenerative changes

are present.



IMPRESSION: Mildly displaced fracture at the tuft of the fifth digit distal phalanx.



Reported By: Jesse Burnett 

Electronically Signed:  12/16/2024 2:59 PM

## 2024-12-16 NOTE — ER
Nurse's Notes                                                                                     

 [facility]                                                                 

Name: Megan Coronado                                                                                   

Age: 64 yrs                                                                                       

Sex: Female                                                                                       

: 1960                                                                                   

MRN: [id number]                                                                                   

Arrival Date: 2024                                                                          

Time: 10:56                                                                                       

Account#: [id number]                                                                            

Bed 15                                                                                            

Private MD:                                                                                       

Diagnosis: Displaced fracture of distal phalanx of finger-left little finger;Laceration without   

  foreign body of left little finger without damage to nail                                       

                                                                                                  

Presentation:                                                                                     

                                                                                             

11:20 Chief complaint: Patient states: "I smashed my left pinky when I was closing a safe and rs5 

      it hurts".                                                                                  

11:20 Coronavirus screen: At this time, the client does not indicate any symptoms associated  rs5 

      with coronavirus-19. Ebola Screen: No symptoms or risks identified at this time.            

      Initial Sepsis Screen: Does the patient meet any 2 criteria? No. Patient's initial          

      sepsis screen is negative. Does the patient have a suspected source of infection? No.       

      Patient's initial sepsis screen is negative. Risk Assessment: Do you want to hurt           

      yourself or someone else? Patient reports no desire to harm self or others. Onset of        

      symptoms was 2024.                                                             

11:20 Method Of Arrival: Ambulatory                                                           rs5 

11:20 Acuity: CECIL 3                                                                           rs5 

                                                                                                  

Triage Assessment:                                                                                

14:00 Injury Description: Crush injury.                                                       me1 

                                                                                                  

Historical:                                                                                       

- Allergies:                                                                                      

11:25 No Known Allergies;                                                                     rs5 

- PMHx:                                                                                           

11:25 breast cancer; high cholesteral;                                                        rs5 

- PSHx:                                                                                           

11:25 Lumpectomy of breast;                                                                   rs5 

                                                                                                  

- Immunization history:: Last tetanus immunization: unknown.                                      

- Infectious Disease History:: Denies.                                                            

- Social history:: Smoking status: Patient denies any tobacco usage or history of.                

                                                                                                  

                                                                                                  

Screenin:00 [facility] ED Fall Risk Assessment (Adult) History of falling in the last 3 months,       me1 

      including since admission No falls in past 3 months (0 pts) Confusion or Disorientation     

      No (0 pts) Intoxicated or Sedated No (0 pts) Impaired Gait No (0 pts) Mobility Assist       

      Device Used No (0 pt) Altered Elimination No (0 pt) Score/Fall Risk Level 0 - 2 = Low       

      Risk Maintained a safe environment, Provided non-skid footwear, Hourly rounding (assess     

      needs \T\ fall precautionary measures) done. Abuse screen: Denies threats or abuse.         

      Nutritional screening: No deficits noted. Tuberculosis screening: No symptoms or risk       

      factors identified.                                                                         

                                                                                                  

Assessment:                                                                                       

11:20 General: Appears in no apparent distress. uncomfortable, Behavior is calm, cooperative. rs5 

      Pain: Complains of pain in left pinky Pain currently is 9 out of 10 on a pain scale.        

      Quality of pain is described as aching, Is continuous. Neuro: Level of Consciousness is     

      awake, alert, obeys commands, Oriented to person, place, time, situation.                   

      Cardiovascular: Patient's skin is warm and dry. Respiratory: Airway is patent               

      Respiratory effort is even, unlabored, Respiratory pattern is regular, symmetrical. GI:     

      Abdomen is round non-distended, Abd is soft and non tender X 4 quads. : No signs          

      and/or symptoms were reported regarding the genitourinary system. EENT: No signs and/or     

      symptoms were reported regarding the EENT system. Derm: Skin is intact, Skin is pink,       

      warm \T\ dry. Wound noted left pinky Wound is 0.5 c.m. lac noted to left pinky, no active   

      bleeding noted.                                                                             

11:20 Musculoskeletal: Range of motion: limited in left hand.                                 rs5 

11:37 Reassessment: Patient and/or family updated on plan of care and expected duration. Pain rs5 

      level reassessed. Patient is alert, oriented x 3, equal unlabored respirations, skin        

      warm/dry/pink.                                                                              

14:01 Injury Description: Crush injury sustained to palmar aspect of distal phalanx of left   me1 

      little finger.                                                                              

                                                                                                  

Vital Signs:                                                                                      

11:20  / 84; Pulse 74; Resp 17; Temp 98(O); Pulse Ox 98% ;                              rs5 

12:00  / 82; Pulse 72; Resp 16; Pulse Ox 100% ;                                         me1 

13:00  / 90; Pulse 72; Resp 15; Pulse Ox 100% ;                                         me1 

13:45  / 81; Pulse 80; Resp 16; Temp 98.4; Pulse Ox 99% ;                               rs5 

                                                                                                  

ED Course:                                                                                        

11:01 Patient arrived in ED.                                                                  mr  

11:02 Ky Leyva PA is PHCP.                                                                cp  

11:02 Pawel Lackey MD is Attending Physician.                                                cp  

11:19 Marcos Kohli, TYRA is Primary Nurse.                                                     rs5 

11:25 Triage completed.                                                                       rs5 

12:00 Patient has correct armband on for positive identification. Bed in low position. Call   me1 

      light in reach. Side rails up X2. Provided Education on: POC. Verbalized                    

      understanding.. Client placed on continuous cardiac and pulse oximetry monitoring. NIBP     

      monitoring applied. Pulse ox on. NIBP on.                                                   

12:00 No provider procedures requiring assistance completed. Patient did not have IV access   me1 

      during this emergency room visit.                                                           

12:51 XRAY Finger-Thumb Left In Process Unspecified.                                          EDMS

14:00 Splint/sling/ice applied as appropriate.                                                me1 

                                                                                                  

Administered Medications:                                                                         

11:20 Drug: Hydrocodone-Acetaminophen PO (7.5 mg-325 mg) 1 tabs PO once; RASS on ADMIN:       rs5 

      Combtv4, Very Agttd3, Agttd2, Rstlss1, AlertClm0, Drwsy-1, Lt Sdtn-2, Mod Sdtn-3, Dp        

      Sdtn-4, UnArsble-5 Route: PO;                                                               

12:22 Follow up: Response: No adverse reaction; Pain is decreased                             rs5 

13:42 Follow up: Response: No adverse reaction; Pain is decreased                             me1 

13:41 Drug: Bupivacaine Infiltration (0.5 %) 10 ml 10 ml Infiltration once {Note:             me1 

      Administered by LAURENT Winter} Volume: 10 ml; Route: Infiltration;                        

14:10 Follow up: Response: No adverse reaction                                                rs5 

13:42 Drug: Lidocaine Infiltration (1 %) 10 ml 5 ml Infiltration once; to bedside {Note:      me1 

      Administered by Ky Leyva PAAna} Volume: 5 ml; Route: Infiltration;                         

14:05 Follow up: Response: No adverse reaction                                                rs5 

13:52 Drug: Boostrix Tdap IM 0.5 ml IM once; as a single dose Route: IM; Site: left deltoid;  me1 

14:01 Follow up: Response: No adverse reaction                                                rs5 

                                                                                                  

                                                                                                  

Medication:                                                                                       

14:01 Vaccine Information Statement (VIS) provided today. Questions and/or concerns           me1 

      addressed. VIS edition date: 2021.                                               

                                                                                                  

Outcome:                                                                                          

13:48 Discharge ordered by MD. estrada  

14:02 Discharged to home ambulatory, with family,                                             me1 

14:02 Condition: stable                                                                           

14:02 Discharge instructions given to patient, family, Instructed on discharge instructions,      

      follow up and referral plans. medication usage, wound care, Demonstrated understanding      

      of instructions, follow-up care, medications, wound care, Prescriptions given X 2,          

14:03 Patient left the ED.                                                                    me1 

                                                                                                  

Signatures:                                                                                       

Dispatcher [employer]                           EDMS                                                 

Jarred Denia, Reg                       Reg  mr                                                   

Ky Leyva PA PA cp Sotelo, Ricky, RN                       RN   rs5                                                  

Lizzie Daly RN                  RN   me1                                                  

                                                                                                  

Corrections: (The following items were deleted from the chart)                                    

17:38 13:30  / 81; Pulse 80bpm; Resp 16bpm; Pulse Ox 99%; Temp 98.4F; me1               rs5 

17:39 13:52 Response: No adverse reaction me1                                                 rs5 

                                                                                                  

**************************************************************************************************